# Patient Record
Sex: FEMALE | Race: WHITE | NOT HISPANIC OR LATINO | Employment: PART TIME | URBAN - METROPOLITAN AREA
[De-identification: names, ages, dates, MRNs, and addresses within clinical notes are randomized per-mention and may not be internally consistent; named-entity substitution may affect disease eponyms.]

---

## 2018-01-11 ENCOUNTER — OFFICE VISIT (OUTPATIENT)
Dept: URGENT CARE | Facility: URGENT CARE | Age: 28
End: 2018-01-11
Payer: COMMERCIAL

## 2018-01-11 VITALS
TEMPERATURE: 99.2 F | DIASTOLIC BLOOD PRESSURE: 73 MMHG | OXYGEN SATURATION: 99 % | SYSTOLIC BLOOD PRESSURE: 123 MMHG | WEIGHT: 138 LBS | HEART RATE: 80 BPM

## 2018-01-11 DIAGNOSIS — R68.84 JAW PAIN: Primary | ICD-10-CM

## 2018-01-11 LAB
DEPRECATED S PYO AG THROAT QL EIA: NORMAL
SPECIMEN SOURCE: NORMAL

## 2018-01-11 PROCEDURE — 87880 STREP A ASSAY W/OPTIC: CPT | Performed by: FAMILY MEDICINE

## 2018-01-11 PROCEDURE — 99213 OFFICE O/P EST LOW 20 MIN: CPT | Performed by: FAMILY MEDICINE

## 2018-01-11 PROCEDURE — 87081 CULTURE SCREEN ONLY: CPT | Performed by: FAMILY MEDICINE

## 2018-01-11 NOTE — LETTER
[facility]  [address] [facility] [address]        January 15, 2018    Meche Morrow  [address] [facility] [address]            Dear Meche,    The results of your recent tests were normal.  Below is a copy of the results.  It was a pleasure to see you at your last appointment.    If you have any questions or concerns, please call myself or my nurse at [phone].    Sincerely,    Altaf Bowles MD/isidro    Results for orders placed or performed in visit on 01/11/18   Strep, Rapid Screen   Result Value Ref Range    Specimen Description Throat     Rapid Strep A Screen       NEGATIVE: No Group A streptococcal antigen detected by immunoassay, await culture report.   Beta strep group A culture   Result Value Ref Range    Specimen Description Throat     Culture Micro No beta hemolytic Streptococcus Group A isolated

## 2018-01-11 NOTE — MR AVS SNAPSHOT
After Visit Summary   1/11/2018    Meche Morrow    MRN: [id number]           Patient Information     Date Of Birth          1990        Visit Information        Provider Department      1/11/2018 5:55 PM Altaf Bowles MD [facility]        Today's Diagnoses     Jaw pain    -  1      Care Instructions      Dental Treatment for Temporomandibular Disorders (TMD)  You have been diagnosed with temporomandibular disorder (TMD).This term describes a group of problems related to the temporomandibular joint (TMJ) and nearby muscles. The TMJ is located where the upper and lower jaws meet and is part of a structural system that includes the teeth. Because the joint and teeth work together, a problem with your teeth and bite can be linked to TMD. If you grind your teeth or if you have a bad bite, your dentist may be able to help. If your teeth need to be realigned to improve your bite, you may be referred to an orthodontist.  If you grind or clench your teeth    Bruxism (teeth grinding) or clenching strains the TMJ and related muscles. If you have these habits during the day, doing self-checks can help you stop. But it s hard to control these habits when you re asleep. That s when the use of a splint can often help:    How a splint works. A splint is an appliance that fits in the mouth. It may also be called an orthotic or . There are different kinds of splints for different kinds of needs. A splint can keep the upper and lower teeth apart. This helps protect tooth surfaces from grinding. A splint can also be made to reduce strain on the area.    Wearing and caring for your splint. To make a splint, your dentist or orthodontist may take impressions of your teeth. Then a splint will be made to fit your mouth. A splint:    May be worn during the day or only at night. Be sure to ask when and how often you should wear your splint.    Should be cleaned before you put it in and after  you take it out. Ask your dentist or orthodontist how to clean the splint.    Should be kept in a protective case, away from the reach of children and pets. This helps keep the splint from getting dirty or broken.  If your bite is incorrect  Malocclusion means the jaws or teeth don t fit together properly. This can result in pain and problems with jaw function. If your jaws or teeth are out of alignment, orthodontic treatment may help. If your bad bite is due to missing or damaged teeth, you may receive restorative treatment.    Restorative treatment. A bad bite can be caused by missing or damaged teeth. A dentist can restore teeth in many ways:    A crown is made of ceramic, metal, or porcelain and metal. It is cemented in place to repair a broken or damaged tooth.    A bridge is a false tooth fused between 2 crowns.    A dental implant is an artificial tooth root. It is attached to the jawbone as a base for an artificial tooth.    Orthodontic treatment. Sometimes the upper and lower jaws are out of alignment. Or teeth are out of line, turned, crowded, or spaced too far apart. Your orthodontist can align teeth with braces and other devices. This helps provide a more comfortable bite.  If surgery is needed  Surgery is rarely needed for TMD. However, if other treatments haven t worked, you may be referred to an oral and maxillofacial surgeon. Talk to your dentist about whether surgery might be right for you.   Date Last Reviewed: 7/13/2015 2000-2017 [employer]. [address] [place] [address], [place] [address]. All rights reserved. This information is not intended as a substitute for professional medical care. Always follow your healthcare professional's instructions.                Follow-ups after your visit        Who to contact     If you have questions or need follow up information about today's clinic visit or your schedule please contact [facility] directly at [phone].  Normal or  "non-critical lab and imaging results will be communicated to you by MyChart, letter or phone within 4 business days after the clinic has received the results. If you do not hear from us within 7 days, please contact the clinic through [employer] or phone. If you have a critical or abnormal lab result, we will notify you by phone as soon as possible.  Submit refill requests through [employer] or call your pharmacy and they will forward the refill request to us. Please allow 3 business days for your refill to be completed.          Additional Information About Your Visit        [facility] Information     [employer] lets you send messages to your doctor, view your test results, renew your prescriptions, schedule appointments and more. To sign up, go to [email or web address] . Click on \"Log in\" on the left side of the screen, which will take you to the Welcome page. Then click on \"Sign up Now\" on the right side of the page.     You will be asked to enter the access code listed below, as well as some personal information. Please follow the directions to create your username and password.     Your access code is: [email or web address]  Expires: 2018  6:56 PM     Your access code will  in 90 days. If you need help or a new code, please call your [facility] clinic or [phone].        Care EveryWhere ID     This is your Care EveryWhere ID. This could be used by other organizations to access your [facility] medical records  [id number]        Your Vitals Were     Pulse Temperature Pulse Oximetry             80 99.2  F (37.3  C) (Oral) 99%          Blood Pressure from Last 3 Encounters:   18 123/73   16 120/72   16 139/78    Weight from Last 3 Encounters:   18 138 lb (62.6 kg)   16 131 lb (59.4 kg)   16 127 lb 8 oz (57.8 kg)              We Performed the Following     Beta strep group A culture     Strep, Rapid Screen        Primary Care Provider Office Phone # Fax #    [facility] " [phone]       [address] [facility] [address]        Equal Access to Services     TADEO JO : Hadii aad ku hadlidiakahlil Baylee, wadrakeda noemichaim, mario albertota kaarturoda nacho, emilie cristobalin hayaan lisakelin diaz laShobhabritt antonio. [facility] [phone].    ATENCIÓN: Si habla español, tiene a parks disposición servicios gratuitos de asistencia lingüística. Llame al [phone].    We comply with applicable federal civil rights laws and [place] laws. We do not discriminate on the basis of race, color, national origin, age, disability, sex, sexual orientation, or gender identity.            Thank you!     Thank you for choosing [facility]  for your care. Our goal is always to provide you with excellent care. Hearing back from our patients is one way we can continue to improve our services. Please take a few minutes to complete the written survey that you may receive in the mail after your visit with us. Thank you!             Your Updated Medication List - Protect others around you: Learn how to safely use, store and throw away your medicines at www.disposemymeds.org.          This list is accurate as of: 1/11/18  6:56 PM.  Always use your most recent med list.                   Brand Name Dispense Instructions for use Diagnosis    drospirenone-ethinyl estradiol 3-0.02 MG per tablet    MAITE     Take 1 tablet by mouth daily        fluticasone 50 MCG/ACT spray    FLONASE    16 g    Spray 2 sprays into both nostrils daily    Sore throat

## 2018-01-12 LAB
BACTERIA SPEC CULT: NORMAL
SPECIMEN SOURCE: NORMAL

## 2018-01-12 NOTE — PROGRESS NOTES
SUBJECTIVE:                                                    Meche Morrow is a 27 year old female who presents to clinic today for the following health issues:      RESPIRATORY SYMPTOMS      Duration: X 3 days    Description  facial pain/pressure and jaw feels tight, teeth feel fuzzy - tonsil is swollen    Severity: mild    Accompanying signs and symptoms: None    History (predisposing factors):  none    Precipitating or alleviating factors: None    Therapies tried and outcome:  None    Today feeling better       Problem list and histories reviewed & adjusted, as indicated.  Additional history: as documented    There is no problem list on file for this patient.    No past surgical history on file.    Social History   Substance Use Topics     Smoking status: Never Smoker     Smokeless tobacco: Not on file     Alcohol use Not on file     No family history on file.      Current Outpatient Prescriptions   Medication Sig Dispense Refill     drospirenone-ethinyl estradiol (MAITE) 3-0.02 MG per tablet Take 1 tablet by mouth daily       fluticasone (FLONASE) 50 MCG/ACT nasal spray Spray 2 sprays into both nostrils daily (Patient not taking: Reported on 1/11/2018) 16 g 0     No Known Allergies  No lab results found.   BP Readings from Last 3 Encounters:   01/11/18 123/73   06/25/16 120/72   06/16/16 139/78    Wt Readings from Last 3 Encounters:   01/11/18 138 lb (62.6 kg)   06/25/16 131 lb (59.4 kg)   06/16/16 127 lb 8 oz (57.8 kg)                  Labs reviewed in EPIC          ROS:  Constitutional, HEENT, cardiovascular, pulmonary, gi and gu systems are negative, except as otherwise noted.      OBJECTIVE:   /73  Pulse 80  Temp 99.2  F (37.3  C) (Oral)  Wt 138 lb (62.6 kg)  SpO2 99%  There is no height or weight on file to calculate BMI.  GENERAL: healthy, alert and no distress  EYES: Eyes grossly normal to inspection, PERRL and conjunctivae and sclerae normal  HENT: normal cephalic/atraumatic, ear canals  and TM's normal, nose and mouth without ulcers or lesions, oropharynx clear, oral mucous membranes moist, sinuses: not tender and noTMJ click or tenderness noted, normal gum and dentition   RESP: lungs clear to auscultation - no rales, rhonchi or wheezes  ABDOMEN: soft, nontender, no hepatosplenomegaly, no masses and bowel sounds normal  MS: no gross musculoskeletal defects noted, no edema  NEURO: Normal strength and tone, mentation intact and speech normal  PSYCH: mentation appears normal, affect normal/bright      Diagnostic Test Results:  Results for orders placed or performed in visit on 01/11/18 (from the past 24 hour(s))   Strep, Rapid Screen   Result Value Ref Range    Specimen Description Throat     Rapid Strep A Screen       NEGATIVE: No Group A streptococcal antigen detected by immunoassay, await culture report.         ASSESSMENT/PLAN:         ICD-10-CM    1. Jaw pain R68.84 Strep, Rapid Screen     Beta strep group A culture       Discussed in detail differentials and further management. No obvious cause identified to to explain her symptoms. Suspect symptoms could be due to dental grinding. Suggested to follow up with her dentist. Dental hygiene discussed. Recommended well hydration and over-the-counter analgesia. Written instructions/information provided. Patient understood and in agreement with the above plan. All questions are answered.         Patient Instructions       Dental Treatment for Temporomandibular Disorders (TMD)  You have been diagnosed with temporomandibular disorder (TMD).This term describes a group of problems related to the temporomandibular joint (TMJ) and nearby muscles. The TMJ is located where the upper and lower jaws meet and is part of a structural system that includes the teeth. Because the joint and teeth work together, a problem with your teeth and bite can be linked to TMD. If you grind your teeth or if you have a bad bite, your dentist may be able to help. If your teeth  need to be realigned to improve your bite, you may be referred to an orthodontist.  If you grind or clench your teeth    Bruxism (teeth grinding) or clenching strains the TMJ and related muscles. If you have these habits during the day, doing self-checks can help you stop. But it s hard to control these habits when you re asleep. That s when the use of a splint can often help:    How a splint works. A splint is an appliance that fits in the mouth. It may also be called an orthotic or . There are different kinds of splints for different kinds of needs. A splint can keep the upper and lower teeth apart. This helps protect tooth surfaces from grinding. A splint can also be made to reduce strain on the area.    Wearing and caring for your splint. To make a splint, your dentist or orthodontist may take impressions of your teeth. Then a splint will be made to fit your mouth. A splint:    May be worn during the day or only at night. Be sure to ask when and how often you should wear your splint.    Should be cleaned before you put it in and after you take it out. Ask your dentist or orthodontist how to clean the splint.    Should be kept in a protective case, away from the reach of children and pets. This helps keep the splint from getting dirty or broken.  If your bite is incorrect  Malocclusion means the jaws or teeth don t fit together properly. This can result in pain and problems with jaw function. If your jaws or teeth are out of alignment, orthodontic treatment may help. If your bad bite is due to missing or damaged teeth, you may receive restorative treatment.    Restorative treatment. A bad bite can be caused by missing or damaged teeth. A dentist can restore teeth in many ways:    A crown is made of ceramic, metal, or porcelain and metal. It is cemented in place to repair a broken or damaged tooth.    A bridge is a false tooth fused between 2 crowns.    A dental implant is an artificial tooth root. It  is attached to the jawbone as a base for an artificial tooth.    Orthodontic treatment. Sometimes the upper and lower jaws are out of alignment. Or teeth are out of line, turned, crowded, or spaced too far apart. Your orthodontist can align teeth with braces and other devices. This helps provide a more comfortable bite.  If surgery is needed  Surgery is rarely needed for TMD. However, if other treatments haven t worked, you may be referred to an oral and maxillofacial surgeon. Talk to your dentist about whether surgery might be right for you.   Date Last Reviewed: 7/13/2015 2000-2017 [employer]. [address] [place] [address], [place] [address]. All rights reserved. This information is not intended as a substitute for professional medical care. Always follow your healthcare professional's instructions.            Altaf Bowles MD  [facility]

## 2018-01-12 NOTE — PATIENT INSTRUCTIONS
Dental Treatment for Temporomandibular Disorders (TMD)  You have been diagnosed with temporomandibular disorder (TMD).This term describes a group of problems related to the temporomandibular joint (TMJ) and nearby muscles. The TMJ is located where the upper and lower jaws meet and is part of a structural system that includes the teeth. Because the joint and teeth work together, a problem with your teeth and bite can be linked to TMD. If you grind your teeth or if you have a bad bite, your dentist may be able to help. If your teeth need to be realigned to improve your bite, you may be referred to an orthodontist.  If you grind or clench your teeth    Bruxism (teeth grinding) or clenching strains the TMJ and related muscles. If you have these habits during the day, doing self-checks can help you stop. But it s hard to control these habits when you re asleep. That s when the use of a splint can often help:    How a splint works. A splint is an appliance that fits in the mouth. It may also be called an orthotic or . There are different kinds of splints for different kinds of needs. A splint can keep the upper and lower teeth apart. This helps protect tooth surfaces from grinding. A splint can also be made to reduce strain on the area.    Wearing and caring for your splint. To make a splint, your dentist or orthodontist may take impressions of your teeth. Then a splint will be made to fit your mouth. A splint:    May be worn during the day or only at night. Be sure to ask when and how often you should wear your splint.    Should be cleaned before you put it in and after you take it out. Ask your dentist or orthodontist how to clean the splint.    Should be kept in a protective case, away from the reach of children and pets. This helps keep the splint from getting dirty or broken.  If your bite is incorrect  Malocclusion means the jaws or teeth don t fit together properly. This can result in pain and problems  with jaw function. If your jaws or teeth are out of alignment, orthodontic treatment may help. If your bad bite is due to missing or damaged teeth, you may receive restorative treatment.    Restorative treatment. A bad bite can be caused by missing or damaged teeth. A dentist can restore teeth in many ways:    A crown is made of ceramic, metal, or porcelain and metal. It is cemented in place to repair a broken or damaged tooth.    A bridge is a false tooth fused between 2 crowns.    A dental implant is an artificial tooth root. It is attached to the jawbone as a base for an artificial tooth.    Orthodontic treatment. Sometimes the upper and lower jaws are out of alignment. Or teeth are out of line, turned, crowded, or spaced too far apart. Your orthodontist can align teeth with braces and other devices. This helps provide a more comfortable bite.  If surgery is needed  Surgery is rarely needed for TMD. However, if other treatments haven t worked, you may be referred to an oral and maxillofacial surgeon. Talk to your dentist about whether surgery might be right for you.   Date Last Reviewed: 7/13/2015 2000-2017 [employer]. [address] [place] [address], [place] [address]. All rights reserved. This information is not intended as a substitute for professional medical care. Always follow your healthcare professional's instructions.

## 2021-12-16 ENCOUNTER — PRE VISIT (OUTPATIENT)
Dept: ONCOLOGY | Facility: CLINIC | Age: 31
End: 2021-12-16

## 2022-05-10 ENCOUNTER — PRENATAL OFFICE VISIT (OUTPATIENT)
Dept: OBGYN | Facility: CLINIC | Age: 32
End: 2022-05-10
Payer: COMMERCIAL

## 2022-05-10 VITALS — BODY MASS INDEX: 29.07 KG/M2 | WEIGHT: 154 LBS | HEIGHT: 61 IN

## 2022-05-10 DIAGNOSIS — Z34.01 ENCOUNTER FOR SUPERVISION OF NORMAL FIRST PREGNANCY IN FIRST TRIMESTER: Primary | ICD-10-CM

## 2022-05-10 DIAGNOSIS — Z11.1 SCREENING EXAMINATION FOR PULMONARY TUBERCULOSIS: ICD-10-CM

## 2022-05-10 DIAGNOSIS — Z23 NEED FOR TDAP VACCINATION: ICD-10-CM

## 2022-05-10 PROCEDURE — 99207 PR NO CHARGE NURSE ONLY: CPT

## 2022-05-10 RX ORDER — PRENATAL VIT/IRON FUM/FOLIC AC 27MG-0.8MG
1 TABLET ORAL DAILY
COMMUNITY

## 2022-05-10 RX ORDER — METRONIDAZOLE 500 MG/1
TABLET ORAL
COMMUNITY
Start: 2022-04-24 | End: 2022-06-03

## 2022-05-10 NOTE — Clinical Note
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 Pt has never been tested for TB, please advise if pt is needed testing prior to her appt on 6/3/2022. Pt denies any other concerns at this time. Thank you, NORA Piedra

## 2022-05-10 NOTE — PROGRESS NOTES
Telephone visit with patient for New Prenatal Intake and Education. This is patient's first pregnancy. Handouts reviewed and will be provided at next prenatal appointment. Scheduled for New Prenatal with Olivia AUGUSTE CNP on 6/3/2022.     Pt's  was treated for TB (unsure if it was active), pt has never been tested and wondering affects on baby if any.      Prenatal OB Questionnaire  Patient supplied answers from flow sheet for:  Prenatal OB Questionnaire.  Past Medical History  Have you ever recieved care for your mental health? : (!) Yes (was being seen in therapy)  Have you ever been in a major accident or suffered serious trauma?: No  Within the last year, has anyone hit, slapped, kicked or otherwise hurt you?: No  In the last year, has anyone forced you to have sex when you didn't want to?: No    Past Medical History 2   Have you ever received a blood transfusion?: No  Would you accept a blood transfusion if was medically recommended?: Yes  Does anyone in your home smoke?: No   Is your blood type Rh negative?: Unknown  Have you ever ?: No  Have you been hospitalized for a nonsurgical reason excluding normal delivery?: No  Have you ever had an abnormal pap smear?: No    Past Medical History (Continued)  Do you have a history of abnormalities of the uterus?: No  Did your mother take BHARTI or any other hormones when she was pregnant with you?: Unknown  Do you have any other problems we have not asked about which you feel may be important to this pregnancy?: (!) Yes (recently treated for BV-nidazole 500 MG)    PHQ-2 Score:     PHQ-2 ( 1999 [employer]) 5/10/2022   Q1: Little interest or pleasure in doing things 0   Q2: Feeling down, depressed or hopeless 0   PHQ-2 Score 0         Allergies as of 5/10/2022:    Allergies as of 05/10/2022     (No Known Allergies)       Current medications are:  Current Outpatient Medications   Medication Sig Dispense Refill     Prenatal Vit-Fe Fumarate-FA (PRENATAL  MULTIVITAMIN W/IRON) 27-0.8 MG tablet Take 1 tablet by mouth daily       drospirenone-ethinyl estradiol (MAITE) 3-0.02 MG per tablet Take 1 tablet by mouth daily (Patient not taking: Reported on 5/10/2022)       fluticasone (FLONASE) 50 MCG/ACT nasal spray Spray 2 sprays into both nostrils daily (Patient not taking: No sig reported) 16 g 0     metroNIDAZOLE (FLAGYL) 500 MG tablet Take 1 Tablet (500 mg) by mouth two times a day for 7 days. NO ALCOHOL WHILE TAKING (Patient not taking: Reported on 5/10/2022)           Early ultrasound screening tool:    Does patient have irregular periods?  No  Did patient use hormonal birth control in the three months prior to positive urine pregnancy test? No  Is the patient breastfeeding?  No  Is the patient 10 weeks or greater at time of education visit?  No    Dorothea Glez RN on 5/10/2022 at 8:37 AM

## 2022-05-15 ENCOUNTER — HEALTH MAINTENANCE LETTER (OUTPATIENT)
Age: 32
End: 2022-05-15

## 2022-05-16 NOTE — PROGRESS NOTES
"  Assessment & Plan     Back pain  Resolved, will await urine culture as noted few WBCs and RBCs in urine  - UA Macro with Reflex to Micro and Culture - lab collect; Future  - UA Macro with Reflex to Micro and Culture - lab collect  - Urine Microscopic Exam  - Urine Culture    Vaginal discharge  None today and not bothersome    Pregnant state, incidental  Follow-up with OB/gyn scheduled early next month               BMI:   Estimated body mass index is 28.91 kg/m  as calculated from the following:    Height as of this encounter: 1.549 m (5' 1\").    Weight as of this encounter: 69.4 kg (153 lb).   Weight management plan: Discussed healthy diet and exercise guidelines        No follow-ups on file.    Kady Rodney MD  [facility]   Meche is a 31 year old who presents for the following health issues     History of Present Illness     Asthma:  She presents for follow up of asthma.  She has no cough, no wheezing, and no shortness of breath. She is not using a relief medication. She typically misses taking her controller medication 7 time(s) per week.Patient is aware of the following triggers: none. The patient has not had a visit to the Emergency Room, Urgent Care or Hospital due to asthma since the last clinic visit.     She eats 2-3 servings of fruits and vegetables daily.She consumes 6 sweetened beverage(s) daily.She exercises with enough effort to increase her heart rate 9 or less minutes per day.  She exercises with enough effort to increase her heart rate 3 or less days per week.   She is taking medications regularly.     Vaginal odor and discharge, pain in back   Not urinating often, shooting pain in abdomen    8 weeks pregnant    Yesterday did not have urge to urinate. Bladder was pretty full and she was drinking a lot of water  Was getting pain in right back.  It felt sore.   Also noticed vaginal odor for one day  Had previously been treated with vaginitis and then had " intercourse with  and noticed smell return now  Not having any vaginal odor at this time  Recalls having a bladder infection x 1.     No pains now, no vaginal odor now          Review of Systems   Constitutional, HEENT, cardiovascular, pulmonary, gi and gu systems are negative, except as otherwise noted.      Objective    LMP 03/20/2022   There is no height or weight on file to calculate BMI.  Physical Exam   GENERAL: healthy, alert and no distress  RESP: lungs clear to auscultation - no rales, rhonchi or wheezes  CV: regular rate and rhythm, normal S1 S2, no S3 or S4, no murmur, click or rub, no peripheral edema and peripheral pulses strong    Results for orders placed or performed in visit on 05/17/22 (from the past 24 hour(s))   UA Macro with Reflex to Micro and Culture - lab collect    Specimen: Urine, Midstream   Result Value Ref Range    Color Urine Yellow Colorless, Straw, Light Yellow, Yellow    Appearance Urine Slightly Cloudy (A) Clear    Glucose Urine Negative Negative mg/dL    Bilirubin Urine Negative Negative    Ketones Urine 15  (A) Negative mg/dL    Specific Gravity Urine >=1.030 1.003 - 1.035    Blood Urine Negative Negative    pH Urine 6.0 5.0 - 7.0    Protein Albumin Urine Negative Negative mg/dL    Urobilinogen Urine 0.2 0.2, 1.0 E.U./dL    Nitrite Urine Negative Negative    Leukocyte Esterase Urine Moderate (A) Negative   Urine Microscopic Exam   Result Value Ref Range    Bacteria Urine Few (A) None Seen /HPF    RBC Urine 5-10 (A) 0-2 /HPF /HPF    WBC Urine 25-50 (A) 0-5 /HPF /HPF    Squamous Epithelials Urine Moderate (A) None Seen /LPF

## 2022-05-17 ENCOUNTER — OFFICE VISIT (OUTPATIENT)
Dept: FAMILY MEDICINE | Facility: CLINIC | Age: 32
End: 2022-05-17
Payer: COMMERCIAL

## 2022-05-17 VITALS
RESPIRATION RATE: 16 BRPM | WEIGHT: 153 LBS | TEMPERATURE: 99.2 F | BODY MASS INDEX: 28.89 KG/M2 | HEIGHT: 61 IN | DIASTOLIC BLOOD PRESSURE: 77 MMHG | OXYGEN SATURATION: 97 % | SYSTOLIC BLOOD PRESSURE: 124 MMHG | HEART RATE: 76 BPM

## 2022-05-17 DIAGNOSIS — R10.9 FLANK PAIN: ICD-10-CM

## 2022-05-17 DIAGNOSIS — N89.8 VAGINAL DISCHARGE: Primary | ICD-10-CM

## 2022-05-17 DIAGNOSIS — Z33.1 PREGNANT STATE, INCIDENTAL: ICD-10-CM

## 2022-05-17 LAB
ALBUMIN UR-MCNC: NEGATIVE MG/DL
APPEARANCE UR: ABNORMAL
BACTERIA #/AREA URNS HPF: ABNORMAL /HPF
BILIRUB UR QL STRIP: NEGATIVE
COLOR UR AUTO: YELLOW
GLUCOSE UR STRIP-MCNC: NEGATIVE MG/DL
HGB UR QL STRIP: NEGATIVE
KETONES UR STRIP-MCNC: 15 MG/DL
LEUKOCYTE ESTERASE UR QL STRIP: ABNORMAL
NITRATE UR QL: NEGATIVE
PH UR STRIP: 6 [PH] (ref 5–7)
RBC #/AREA URNS AUTO: ABNORMAL /HPF
SP GR UR STRIP: >=1.03 (ref 1–1.03)
SQUAMOUS #/AREA URNS AUTO: ABNORMAL /LPF
UROBILINOGEN UR STRIP-ACNC: 0.2 E.U./DL
WBC #/AREA URNS AUTO: ABNORMAL /HPF

## 2022-05-17 PROCEDURE — 99203 OFFICE O/P NEW LOW 30 MIN: CPT | Performed by: FAMILY MEDICINE

## 2022-05-17 PROCEDURE — 87086 URINE CULTURE/COLONY COUNT: CPT | Performed by: FAMILY MEDICINE

## 2022-05-17 PROCEDURE — 81001 URINALYSIS AUTO W/SCOPE: CPT | Performed by: FAMILY MEDICINE

## 2022-05-17 ASSESSMENT — PAIN SCALES - GENERAL: PAINLEVEL: NO PAIN (0)

## 2022-05-19 LAB — BACTERIA UR CULT: NO GROWTH

## 2022-06-03 ENCOUNTER — PRENATAL OFFICE VISIT (OUTPATIENT)
Dept: OBGYN | Facility: CLINIC | Age: 32
End: 2022-06-03
Payer: COMMERCIAL

## 2022-06-03 VITALS
DIASTOLIC BLOOD PRESSURE: 78 MMHG | WEIGHT: 157.8 LBS | OXYGEN SATURATION: 94 % | HEART RATE: 89 BPM | BODY MASS INDEX: 29.79 KG/M2 | SYSTOLIC BLOOD PRESSURE: 128 MMHG | HEIGHT: 61 IN

## 2022-06-03 DIAGNOSIS — Z34.00 SUPERVISION OF NORMAL FIRST PREGNANCY, ANTEPARTUM: Primary | ICD-10-CM

## 2022-06-03 DIAGNOSIS — Z11.1 SCREENING EXAMINATION FOR PULMONARY TUBERCULOSIS: ICD-10-CM

## 2022-06-03 DIAGNOSIS — Z34.01 ENCOUNTER FOR SUPERVISION OF NORMAL FIRST PREGNANCY IN FIRST TRIMESTER: ICD-10-CM

## 2022-06-03 LAB
ABO/RH(D): NORMAL
ALBUMIN UR-MCNC: NEGATIVE MG/DL
AMORPH CRY #/AREA URNS HPF: ABNORMAL /HPF
ANTIBODY SCREEN: NEGATIVE
APPEARANCE UR: ABNORMAL
BILIRUB UR QL STRIP: NEGATIVE
COLOR UR AUTO: YELLOW
ERYTHROCYTE [DISTWIDTH] IN BLOOD BY AUTOMATED COUNT: 12.1 % (ref 10–15)
GLUCOSE UR STRIP-MCNC: NEGATIVE MG/DL
HCT VFR BLD AUTO: 37.3 % (ref 35–47)
HGB BLD-MCNC: 13 G/DL (ref 11.7–15.7)
HGB UR QL STRIP: NEGATIVE
KETONES UR STRIP-MCNC: NEGATIVE MG/DL
LEUKOCYTE ESTERASE UR QL STRIP: ABNORMAL
MCH RBC QN AUTO: 29.6 PG (ref 26.5–33)
MCHC RBC AUTO-ENTMCNC: 34.9 G/DL (ref 31.5–36.5)
MCV RBC AUTO: 85 FL (ref 78–100)
NITRATE UR QL: NEGATIVE
PH UR STRIP: 7 [PH] (ref 5–7)
PLATELET # BLD AUTO: 300 10E3/UL (ref 150–450)
RBC # BLD AUTO: 4.39 10E6/UL (ref 3.8–5.2)
RBC #/AREA URNS AUTO: ABNORMAL /HPF
SP GR UR STRIP: 1.01 (ref 1–1.03)
SPECIMEN EXPIRATION DATE: NORMAL
SQUAMOUS #/AREA URNS AUTO: ABNORMAL /LPF
UROBILINOGEN UR STRIP-ACNC: 0.2 E.U./DL
WBC # BLD AUTO: 9.5 10E3/UL (ref 4–11)
WBC #/AREA URNS AUTO: ABNORMAL /HPF

## 2022-06-03 PROCEDURE — 86900 BLOOD TYPING SEROLOGIC ABO: CPT | Performed by: NURSE PRACTITIONER

## 2022-06-03 PROCEDURE — 99207 PR FIRST OB VISIT: CPT | Performed by: NURSE PRACTITIONER

## 2022-06-03 PROCEDURE — 87591 N.GONORRHOEAE DNA AMP PROB: CPT | Performed by: NURSE PRACTITIONER

## 2022-06-03 PROCEDURE — 86901 BLOOD TYPING SEROLOGIC RH(D): CPT | Performed by: NURSE PRACTITIONER

## 2022-06-03 PROCEDURE — G0145 SCR C/V CYTO,THINLAYER,RESCR: HCPCS | Performed by: NURSE PRACTITIONER

## 2022-06-03 PROCEDURE — 86762 RUBELLA ANTIBODY: CPT | Performed by: NURSE PRACTITIONER

## 2022-06-03 PROCEDURE — 87491 CHLMYD TRACH DNA AMP PROBE: CPT | Performed by: NURSE PRACTITIONER

## 2022-06-03 PROCEDURE — 85027 COMPLETE CBC AUTOMATED: CPT | Performed by: NURSE PRACTITIONER

## 2022-06-03 PROCEDURE — 86780 TREPONEMA PALLIDUM: CPT | Performed by: NURSE PRACTITIONER

## 2022-06-03 PROCEDURE — 86850 RBC ANTIBODY SCREEN: CPT | Performed by: NURSE PRACTITIONER

## 2022-06-03 PROCEDURE — 36415 COLL VENOUS BLD VENIPUNCTURE: CPT | Performed by: NURSE PRACTITIONER

## 2022-06-03 PROCEDURE — 87389 HIV-1 AG W/HIV-1&-2 AB AG IA: CPT | Performed by: NURSE PRACTITIONER

## 2022-06-03 PROCEDURE — 87086 URINE CULTURE/COLONY COUNT: CPT | Performed by: NURSE PRACTITIONER

## 2022-06-03 PROCEDURE — 81001 URINALYSIS AUTO W/SCOPE: CPT | Performed by: NURSE PRACTITIONER

## 2022-06-03 PROCEDURE — 87340 HEPATITIS B SURFACE AG IA: CPT | Performed by: NURSE PRACTITIONER

## 2022-06-03 PROCEDURE — 86803 HEPATITIS C AB TEST: CPT | Performed by: NURSE PRACTITIONER

## 2022-06-03 PROCEDURE — 86481 TB AG RESPONSE T-CELL SUSP: CPT | Performed by: NURSE PRACTITIONER

## 2022-06-03 ASSESSMENT — PAIN SCALES - GENERAL: PAINLEVEL: NO PAIN (0)

## 2022-06-03 NOTE — PATIENT INSTRUCTIONS
If you have any questions regarding your visit, Please contact your care team.     [facility]: [phone]  To Schedule an Appointment 24/7  Call: [phone] HOURS TELEPHONE NUMBER     Olivia Ogden- APRN CNP      Lorna Piedra-NORA Pearson-RN  Torri Burkett-Surgery Scheduler  Annabella-Surgery Scheduler         Monday 7:30 am-5:00 pm    Tuesday 8:00 am-4:00 pm    Wednesday 7:30 am-4:00 pm  [place]    Thursday 8:00 am-11:00 am    Friday 7:30 am-4:00 pm [address] [place] [address] [phone] ask for [facility]  [phone] Fax    Imaging Scheduling all locations  [phone]     [facility] Labor and Delivery  [address] [place] [address]   [place], [place] [id number] [phone]         Urgent Care locations:  [facility]   Monday-Friday  10 am - 8 pm  Saturday and Sunday   9 am - 5 pm     [phone]   If you need a medication refill, please contact your pharmacy. Please allow 3 business days for your refill to be completed.  As always, Thank you for trusting us with your healthcare needs!      see additional instructions from your care team below

## 2022-06-03 NOTE — PROGRESS NOTES
"Meche is a 31 year old  @ 10 weeks here for new OB visit.  Cycles regular.  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   with latent TB. Was treated in 2017, recent chest x-ray clear. Patient has never been screened, will check TB gold today.    See OB questionnaire for pertinent components of HPI.    OBhx: never pregnant  Gyne: Pap smears Normal  Past Medical History:   Diagnosis Date     HSV (herpes simplex virus) infection      Past Surgical History:   Procedure Laterality Date     NO HISTORY OF SURGERY       Patient Active Problem List    Diagnosis Date Noted     Need for Tdap vaccination 05/10/2022     Priority: Medium      No Known Allergies  Current Outpatient Medications   Medication Sig Dispense Refill     Prenatal Vit-Fe Fumarate-FA (PRENATAL MULTIVITAMIN W/IRON) 27-0.8 MG tablet Take 1 tablet by mouth daily         Review of Systems:     CONSTITUTIONAL:NEGATIVE for fever, chills, change in weight  INTEGUMENTARY/SKIN: NEGATIVE for worrisome rashes, moles or lesions  EYES: NEGATIVE for vision changes or irritation  ENT/MOUTH: NEGATIVE for ear, mouth and throat problems  RESP:NEGATIVE for significant cough or SOB  BREAST: NEGATIVE for masses, tenderness or discharge  CV: NEGATIVE for chest pain, palpitations or peripheral edema  GI: NEGATIVE for nausea, abdominal pain, heartburn, or change in bowel habits  :  Denies current vaginal bleeding, abnormal vaginal discharge  NEURO: NEGATIVE for weakness, dizziness or paresthesias  HEME/ALLERGY/IMMUNE: NEGATIVE for bleeding problems  PSYCHIATRIC: NEGATIVE for changes in mood or affect      Past Medical History of Father of Baby: Latent TB  History Since Last Menstrual Period: No Problems    Physical Exam: /78 (BP Location: Right arm, Patient Position: Sitting, Cuff Size: Adult Regular)   Pulse 89   Ht 1.549 m (5' 1\")   Wt 71.6 kg (157 lb 12.8 oz)   LMP 2022   SpO2 94%   BMI 29.82 " kg/m    General: Well developed, well nourished female  Skin: Normal  Abdomen: Benign and No masses, organomegaly    Extremities: Normal  Neurological: Normal   Perineum: Intact   Vulva: Normal  Vagina: Normal mucosa, no discharge  Cervix: Nulliparous, closed    A/P 31 year old  at 10 weeks  Discussed physician coverage, tertiary support, diet, exercise, weight gain, schedule of visits, routine and indicated ultrasounds, and childbirth education.  Prenatal labs: Prenatal Panel, GC, Chlamydia, Urine Culture, Pap smear.  Continue taking prenatal vitamins  Discussed maternal quad screening between 15-20 weeks and reviewed benefits and limitations.  Ultrasound to confirm dates.    Check TB gold today.   Valtrex at 35 weeks.    Discussed aspirin use in pregnancy.  Low-dose aspirin prophylaxis can be beneficial in women at high risk of developing preeclampsia.  I generally recommend we begin aspirin at about 12-13 weeks gestation and continue until at least 36 weeks.     Women with at least one of the following conditions are considered high risk for developing preeclampsia: Previous pregnancy with preeclampsia,  multifetal-gestation, chronic hypertension, diabetes mellitus, chronic kidney disease, autoimmune disease.     Women with more than 1 of the following conditions may also consider low-dose aspirin prophylaxis in pregnancy: Nulliparity, BMI greater than 30, family history of preeclampsia (mother or sister), AMA, socio-demographic characteristics, personal risk factors.       Patient does not meet the above criteria.       Olivia AUGUSTE CNP

## 2022-06-04 LAB
C TRACH DNA SPEC QL NAA+PROBE: NEGATIVE
HBV SURFACE AG SERPL QL IA: NONREACTIVE
HCV AB SERPL QL IA: NONREACTIVE
HIV 1+2 AB+HIV1 P24 AG SERPL QL IA: NONREACTIVE
N GONORRHOEA DNA SPEC QL NAA+PROBE: NEGATIVE
T PALLIDUM AB SER QL: NONREACTIVE

## 2022-06-05 LAB
BACTERIA UR CULT: NO GROWTH
RUBV IGG SERPL QL IA: 4.83 INDEX
RUBV IGG SERPL QL IA: POSITIVE

## 2022-06-06 LAB
GAMMA INTERFERON BACKGROUND BLD IA-ACNC: 0.04 IU/ML
M TB IFN-G BLD-IMP: NEGATIVE
M TB IFN-G CD4+ BCKGRND COR BLD-ACNC: 9.96 IU/ML
MITOGEN IGNF BCKGRD COR BLD-ACNC: 0 IU/ML
MITOGEN IGNF BCKGRD COR BLD-ACNC: 0 IU/ML
QUANTIFERON MITOGEN: 10 IU/ML
QUANTIFERON NIL TUBE: 0.04 IU/ML
QUANTIFERON TB1 TUBE: 0.04 IU/ML
QUANTIFERON TB2 TUBE: 0.04

## 2022-06-07 LAB
BKR LAB AP GYN ADEQUACY: NORMAL
BKR LAB AP GYN INTERPRETATION: NORMAL
BKR LAB AP HPV REFLEX: NORMAL
BKR LAB AP PREVIOUS ABNORMAL: NORMAL
PATH REPORT.COMMENTS IMP SPEC: NORMAL
PATH REPORT.COMMENTS IMP SPEC: NORMAL
PATH REPORT.RELEVANT HX SPEC: NORMAL

## 2022-06-09 ENCOUNTER — ANCILLARY PROCEDURE (OUTPATIENT)
Dept: ULTRASOUND IMAGING | Facility: CLINIC | Age: 32
End: 2022-06-09
Attending: NURSE PRACTITIONER
Payer: COMMERCIAL

## 2022-06-09 DIAGNOSIS — Z34.00 SUPERVISION OF NORMAL FIRST PREGNANCY, ANTEPARTUM: ICD-10-CM

## 2022-06-09 PROCEDURE — 76801 OB US < 14 WKS SINGLE FETUS: CPT | Mod: TC | Performed by: RADIOLOGY

## 2022-06-09 PROCEDURE — 76817 TRANSVAGINAL US OBSTETRIC: CPT | Mod: TC | Performed by: RADIOLOGY

## 2022-06-24 ENCOUNTER — MYC MEDICAL ADVICE (OUTPATIENT)
Dept: OBGYN | Facility: CLINIC | Age: 32
End: 2022-06-24

## 2022-06-28 ENCOUNTER — PRENATAL OFFICE VISIT (OUTPATIENT)
Dept: OBGYN | Facility: CLINIC | Age: 32
End: 2022-06-28
Payer: COMMERCIAL

## 2022-06-28 VITALS
OXYGEN SATURATION: 96 % | HEART RATE: 80 BPM | HEIGHT: 61 IN | WEIGHT: 160 LBS | BODY MASS INDEX: 30.21 KG/M2 | SYSTOLIC BLOOD PRESSURE: 134 MMHG | DIASTOLIC BLOOD PRESSURE: 75 MMHG

## 2022-06-28 DIAGNOSIS — Z34.00 SUPERVISION OF NORMAL FIRST PREGNANCY, ANTEPARTUM: Primary | ICD-10-CM

## 2022-06-28 PROCEDURE — 99207 PR PRENATAL VISIT: CPT | Performed by: NURSE PRACTITIONER

## 2022-06-28 ASSESSMENT — PAIN SCALES - GENERAL: PAINLEVEL: NO PAIN (0)

## 2022-06-28 NOTE — PROGRESS NOTES
Patient presents for routine prenatal visit. Prenatal flowsheet reviewed and updated as needed.  Denies vaginal bleeding, loss of fluid, contractions or cramping. Denies headache, upper abdominal pain, vision changes, lower extremity swelling, chest pain or shortness of breath. Having intermittent nausea/dry heaving.     Anticipatory guidance appropriate for gestational age reviewed.  PE: See OB vitals    Routine prenatal care:  Screening ultrasound ordered.  Discussed MQS on return to clinic.    Questions asked and answered. Next OB visit in 4 week(s) with OB Physician.    Olivia AUGUSTE CNP

## 2022-06-28 NOTE — PATIENT INSTRUCTIONS
If you have any questions regarding your visit, Please contact your care team.     [facility] Services: [phone]  To Schedule an Appointment 24/7  Call: [phone] HOURS TELEPHONE NUMBER     Olivia Ogden- APRN CNP      Lorna Piedra-NORA Pearson-RN  Torri Burkett-Surgery Scheduler  Annabella-Surgery Scheduler         Monday 7:30 am-5:00 pm    Tuesday 8:00 am-4:00 pm    Wednesday 7:30 am-4:00 pm  [place]    Thursday 8:00 am-11:00 am    Friday 7:30 am-4:00 pm [address] [place] [address] [phone] ask for [facility]  [phone] Fax    Imaging Scheduling all locations  [phone]     [facility] Labor and Delivery  [address] [place] [address]   [place], [place] [id number] [phone]         Urgent Care locations:  [facility]   Monday-Friday  10 am - 8 pm  Saturday and Sunday   9 am - 5 pm     [phone]   If you need a medication refill, please contact your pharmacy. Please allow 3 business days for your refill to be completed.  As always, Thank you for trusting us with your healthcare needs!      see additional instructions from your care team below

## 2022-06-30 ENCOUNTER — TELEPHONE (OUTPATIENT)
Dept: OBGYN | Facility: CLINIC | Age: 32
End: 2022-06-30

## 2022-06-30 NOTE — TELEPHONE ENCOUNTER
Unable to reach patient via phone. Left message to call back at [phone]    Pt just need to be schedule with MD or DO in 4 weeks for est prenatal per Ruchi note.    Jennifer Dc, FANNY 6/30/2022 2:17 PM

## 2022-06-30 NOTE — TELEPHONE ENCOUNTER
"[facility] Call Center    Phone Message    May a detailed message be left on voicemail: yes     Reason for Call: Pt said that she is very forgetful.  She said that Olivia Ogden told her to schedule a \"very specific\" appointment but Pt doesn't know what kind of appointment it is.  She was told by nurses to schedule an established prenatal visit but Pt thinks that's incorrect.  She was also told that this \"very specific\" appointment needs to be with a \"special\" provider but she doesn't remember the name of the provider she is supposed to schedule with.  She thinks it might be a Dr. Allen.  Please call Pt to clarify.  Thanks.    Action Taken: Message routed to:  [phone]    Travel Screening: Not Applicable                                                                      "

## 2022-07-01 NOTE — TELEPHONE ENCOUNTER
Patient scheduled for next prenatal visit with Dr. Salas.  Jennifer Dc, [facility] 7/1/2022 8:56 AM

## 2022-07-26 ENCOUNTER — PRENATAL OFFICE VISIT (OUTPATIENT)
Dept: OBGYN | Facility: CLINIC | Age: 32
End: 2022-07-26
Payer: COMMERCIAL

## 2022-07-26 VITALS
DIASTOLIC BLOOD PRESSURE: 81 MMHG | SYSTOLIC BLOOD PRESSURE: 126 MMHG | HEART RATE: 96 BPM | WEIGHT: 168.6 LBS | BODY MASS INDEX: 31.86 KG/M2

## 2022-07-26 DIAGNOSIS — R00.0 TACHYCARDIA: ICD-10-CM

## 2022-07-26 DIAGNOSIS — Z34.00 SUPERVISION OF NORMAL FIRST PREGNANCY, ANTEPARTUM: Primary | ICD-10-CM

## 2022-07-26 PROCEDURE — 99207 PR PRENATAL VISIT: CPT | Performed by: OBSTETRICS & GYNECOLOGY

## 2022-07-26 PROCEDURE — 36415 COLL VENOUS BLD VENIPUNCTURE: CPT | Performed by: OBSTETRICS & GYNECOLOGY

## 2022-07-26 PROCEDURE — 81511 FTL CGEN ABNOR FOUR ANAL: CPT | Mod: 90 | Performed by: OBSTETRICS & GYNECOLOGY

## 2022-07-26 PROCEDURE — 99000 SPECIMEN HANDLING OFFICE-LAB: CPT | Performed by: OBSTETRICS & GYNECOLOGY

## 2022-07-26 RX ORDER — ALBUTEROL SULFATE 90 UG/1
1-2 AEROSOL, METERED RESPIRATORY (INHALATION)
COMMUNITY
Start: 2021-02-12 | End: 2022-08-23

## 2022-07-26 NOTE — PROGRESS NOTES
Presents for routine  appointment.    Concerns:  Can feel rapid heart rate randomly at work or while laying down. No cp, sob. Can last for hours, nothing really helps.  Adequate water hydration.    No LOF/VB/Ctxs.  +Fetal Movement  ROS:   and GI  negative.     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 However, recommend EKG with FP. Patient aware and will schedule.    Routine Prenatal Care:  -Genetic screening - Quad screen ordered  -20 week ultrasound scheduled       Follow up in 4 week(s).    Raine Salas DO

## 2022-07-28 LAB
# FETUSES US: NORMAL
AFP MOM SERPL: 0.93
AFP SERPL-MCNC: 40 NG/ML
AGE - REPORTED: 30.5 YR
CURRENT SMOKER: NO
FAMILY MEMBER DISEASES HX: NO
GA METHOD: NORMAL
GA: NORMAL WK
HCG MOM SERPL: 1.4
HCG SERPL-ACNC: NORMAL IU/L
HX OF HEREDITARY DISORDERS: NO
IDDM PATIENT QL: NO
INHIBIN A MOM SERPL: 1.37
INHIBIN A SERPL-MCNC: 206 PG/ML
INTEGRATED SCN PATIENT-IMP: NORMAL
PATHOLOGY STUDY: NORMAL
SPECIMEN DRAWN SERPL: NORMAL
U ESTRIOL MOM SERPL: 1.16
U ESTRIOL SERPL-MCNC: 1.89 NG/ML

## 2022-08-01 ENCOUNTER — ANCILLARY PROCEDURE (OUTPATIENT)
Dept: ULTRASOUND IMAGING | Facility: CLINIC | Age: 32
End: 2022-08-01
Attending: NURSE PRACTITIONER
Payer: COMMERCIAL

## 2022-08-01 DIAGNOSIS — Z34.00 SUPERVISION OF NORMAL FIRST PREGNANCY, ANTEPARTUM: ICD-10-CM

## 2022-08-01 PROCEDURE — 76805 OB US >/= 14 WKS SNGL FETUS: CPT | Mod: TC | Performed by: RADIOLOGY

## 2022-08-23 ENCOUNTER — PRENATAL OFFICE VISIT (OUTPATIENT)
Dept: OBGYN | Facility: CLINIC | Age: 32
End: 2022-08-23
Payer: COMMERCIAL

## 2022-08-23 VITALS
SYSTOLIC BLOOD PRESSURE: 130 MMHG | HEIGHT: 61 IN | DIASTOLIC BLOOD PRESSURE: 81 MMHG | BODY MASS INDEX: 33.61 KG/M2 | OXYGEN SATURATION: 96 % | WEIGHT: 178 LBS | HEART RATE: 90 BPM

## 2022-08-23 DIAGNOSIS — Z34.00 SUPERVISION OF NORMAL FIRST PREGNANCY, ANTEPARTUM: Primary | ICD-10-CM

## 2022-08-23 PROCEDURE — 99207 PR PRENATAL VISIT: CPT | Performed by: NURSE PRACTITIONER

## 2022-08-23 ASSESSMENT — PAIN SCALES - GENERAL: PAINLEVEL: NO PAIN (0)

## 2022-08-23 NOTE — PROGRESS NOTES
Patient presents for routine prenatal visit. Prenatal flowsheet reviewed and updated as needed.  Denies vaginal bleeding, loss of fluid, contractions or cramping. Denies headache, nausea/vomiting, upper abdominal pain, vision changes, lower extremity swelling, chest pain or shortness of breath.     Anticipatory guidance appropriate for gestational age reviewed.  PE: See OB vitals    Pregnancy complicated by:  Last visit had discussed tachycardia-was to get EKG to evaluate and this has not been done. Patient states she feels this is anxiety as it manifests in specific situations-like work related. No other concerning symptoms. Would still recommend she complete this to rule out cardiac concerns. To scheduled with FP.    Routine prenatal care:  Labs on return to clinic.    Questions asked and answered. Next OB visit in 4 week(s) with OB Physician.    Olivia AUGUSTE CNP

## 2022-08-23 NOTE — PATIENT INSTRUCTIONS
If you have any questions regarding your visit, Please contact your care team.     [facility] Services: [phone]  To Schedule an Appointment 24/7  Call: [phone] Medical Center HOURS TELEPHONE NUMBER     Olivai Ogden- APRN CNP      Lorna Piedra-NORA Pearson-NORA Burkett-Surgery Scheduler  Annabella-Surgery Scheduler         Monday 7:30 am-5:00 pm    Tuesday 8:00 am-4:00 pm    Wednesday 7:30 am-4:00 pm  [place]    Thursday 8:00 am-11:00 am    Friday 7:30 am-4:00 pm [place] [address] [place] [address] [phone] ask for [facility]  [phone] Fax    Imaging Scheduling all locations  [phone]     [facility] Labor and Delivery  [address] [place] [address] Dr.  [place], [place] [id number] [phone]         Urgent Care locations:  [facility]   Monday-Friday  10 am - 8 pm  Saturday and Sunday   9 am - 5 pm     [phone]   If you need a medication refill, please contact your pharmacy. Please allow 3 business days for your refill to be completed.  As always, Thank you for trusting us with your healthcare needs!      see additional instructions from your care team below

## 2022-09-11 ENCOUNTER — HEALTH MAINTENANCE LETTER (OUTPATIENT)
Age: 32
End: 2022-09-11

## 2022-09-20 ENCOUNTER — NURSE TRIAGE (OUTPATIENT)
Dept: NURSING | Facility: CLINIC | Age: 32
End: 2022-09-20

## 2022-09-20 ENCOUNTER — PRENATAL OFFICE VISIT (OUTPATIENT)
Dept: OBGYN | Facility: CLINIC | Age: 32
End: 2022-09-20
Payer: COMMERCIAL

## 2022-09-20 VITALS
SYSTOLIC BLOOD PRESSURE: 130 MMHG | BODY MASS INDEX: 34.31 KG/M2 | HEART RATE: 87 BPM | WEIGHT: 181.6 LBS | OXYGEN SATURATION: 97 % | DIASTOLIC BLOOD PRESSURE: 75 MMHG

## 2022-09-20 DIAGNOSIS — R73.09 ABNORMAL GLUCOSE TOLERANCE TEST: ICD-10-CM

## 2022-09-20 DIAGNOSIS — Z34.00 SUPERVISION OF NORMAL FIRST PREGNANCY, ANTEPARTUM: Primary | ICD-10-CM

## 2022-09-20 LAB
GLUCOSE 1H P 50 G GLC PO SERPL-MCNC: 131 MG/DL (ref 70–129)
HGB BLD-MCNC: 12.7 G/DL (ref 11.7–15.7)

## 2022-09-20 PROCEDURE — 99207 PR PRENATAL VISIT: CPT | Performed by: NURSE PRACTITIONER

## 2022-09-20 PROCEDURE — 85018 HEMOGLOBIN: CPT | Performed by: NURSE PRACTITIONER

## 2022-09-20 PROCEDURE — 36415 COLL VENOUS BLD VENIPUNCTURE: CPT | Performed by: NURSE PRACTITIONER

## 2022-09-20 PROCEDURE — 82950 GLUCOSE TEST: CPT | Performed by: NURSE PRACTITIONER

## 2022-09-20 NOTE — PROGRESS NOTES
Patient presents for routine prenatal visit. Prenatal flowsheet reviewed and updated as needed.  Denies vaginal bleeding, loss of fluid, contractions or cramping. Denies headache, nausea/vomiting, upper abdominal pain, vision changes, lower extremity swelling, chest pain or shortness of breath.     Anticipatory guidance appropriate for gestational age reviewed.  PE: See OB vitals    Routine prenatal care:  Hx: HSV-plan Valtrex 3rd trimester  Has declined completing previously recommended EKG  Discussed Tdap on return to clinic.    Questions asked and answered. Next OB visit in 3-4 week(s) with OB Physician.    Olivia AUGUSTE CNP

## 2022-09-20 NOTE — TELEPHONE ENCOUNTER
Patient calling stating she has gestational glucose test today and accidentally ate.    Patient is requesting to know if she needs to reschedule?    Placed call to [facility] lab and was advised patient did not need to fast for 1 hour glucose testing.    Caller verbalized understanding. Denies further questions.      Meche Nina RN  [facility]        Reason for Disposition    Information only question and nurse able to answer    Additional Information    Negative: Nursing judgment    Negative: Nursing judgment    Negative: Nursing judgment    Negative: Nursing judgment    Protocols used: INFORMATION ONLY CALL - NO TRIAGE-A-OH

## 2022-09-20 NOTE — PATIENT INSTRUCTIONS
If you have any questions regarding your visit, Please contact your care team.     [facility] Services: [phone]  To Schedule an Appointment 24/7  Call: [phone] Memorial Hospital HOURS TELEPHONE NUMBER     Olivia Ogden- APRN CNP      Lorna Palma-Surgery Scheduler  Annabella-Surgery Scheduler         Monday 7:30 am-5:00 pm    Tuesday 8:00 am-4:00 pm    Wednesday 7:30 am-4:00 pm  [place]    Thursday 8:00 am-11:00 am    Friday 7:30 am-4:00 pm [address] [place] [address] [phone] ask for [facility]  [phone] Fax    Imaging Scheduling all locations  [phone]     [facility] Labor and Delivery  [address] [place] [address]   [place], [place] [id number] [phone]         Urgent Care locations:  [facility]   Monday-Friday  10 am - 8 pm  Saturday and Sunday   9 am - 5 pm     [phone]   If you need a medication refill, please contact your pharmacy. Please allow 3 business days for your refill to be completed.  As always, Thank you for trusting us with your healthcare needs!      see additional instructions from your care team below

## 2022-09-21 PROBLEM — R73.09 ABNORMAL GLUCOSE TOLERANCE TEST: Status: ACTIVE | Noted: 2022-09-21

## 2022-09-22 ENCOUNTER — TELEPHONE (OUTPATIENT)
Dept: OBGYN | Facility: CLINIC | Age: 32
End: 2022-09-22

## 2022-09-22 ENCOUNTER — LAB (OUTPATIENT)
Dept: LAB | Facility: CLINIC | Age: 32
End: 2022-09-22
Payer: COMMERCIAL

## 2022-09-22 DIAGNOSIS — R73.09 ABNORMAL GLUCOSE TOLERANCE TEST: ICD-10-CM

## 2022-09-22 LAB — GESTATIONAL GTT 1 HR POST DOSE: 131 MG/DL (ref 60–179)

## 2022-09-22 PROCEDURE — 82951 GLUCOSE TOLERANCE TEST (GTT): CPT

## 2022-09-22 PROCEDURE — 82952 GTT-ADDED SAMPLES: CPT

## 2022-09-22 PROCEDURE — 36415 COLL VENOUS BLD VENIPUNCTURE: CPT

## 2022-09-22 NOTE — TELEPHONE ENCOUNTER
patient wants to get a note for her  to get off work when the baby comes. Pt says baby is due Berta day. Jesus ?'s. Call Meche.

## 2022-09-23 PROBLEM — R73.09 ABNORMAL GLUCOSE TOLERANCE TEST: Status: RESOLVED | Noted: 2022-09-21 | Resolved: 2022-09-23

## 2022-09-23 LAB
GESTATIONAL GTT 2 HR POST DOSE: 127 MG/DL (ref 60–154)
GESTATIONAL GTT 3 HR POST DOSE: 120 MG/DL (ref 60–139)
GLUCOSE P FAST SERPL-MCNC: 69 MG/DL (ref 60–94)

## 2022-09-28 ENCOUNTER — MYC MEDICAL ADVICE (OUTPATIENT)
Dept: OBGYN | Facility: CLINIC | Age: 32
End: 2022-09-28

## 2022-09-28 NOTE — TELEPHONE ENCOUNTER
", 27w3d.    Pt is wondering if it is okay for her to use her albuterol inhaler once in awhile for her asthma.    Per uptodate on albuterol inhaler during pregnancy:  \"Albuterol is the preferred short-acting beta-2 agonist (ESPERANZA) for the management of asthma during pregnancy (ERS/TSANZ [[employer] 2020])\"      "

## 2022-09-28 NOTE — LETTER
[facility]  [address] [id number] [phone]          September 28, 2022    Meche Morrow                                                                                                                     [address] [facility] [address]            To whom it may concern,    The above patient is under my professional medical care for pregnancy.  Her estimated due date is 12/25/2022.        Sincerely,         Olivia AUGUSTE CNP

## 2022-10-07 ENCOUNTER — LAB (OUTPATIENT)
Dept: LAB | Facility: CLINIC | Age: 32
End: 2022-10-07

## 2022-10-07 ENCOUNTER — LAB (OUTPATIENT)
Dept: LAB | Facility: CLINIC | Age: 32
End: 2022-10-07
Payer: COMMERCIAL

## 2022-10-07 DIAGNOSIS — N89.8 VAGINAL DISCHARGE: ICD-10-CM

## 2022-10-07 LAB
CLUE CELLS: ABNORMAL
TRICHOMONAS, WET PREP: ABNORMAL
WBC'S/HIGH POWER FIELD, WET PREP: ABNORMAL
YEAST, WET PREP: ABNORMAL

## 2022-10-07 PROCEDURE — 87210 SMEAR WET MOUNT SALINE/INK: CPT

## 2022-10-24 ENCOUNTER — PRENATAL OFFICE VISIT (OUTPATIENT)
Dept: OBGYN | Facility: CLINIC | Age: 32
End: 2022-10-24
Payer: COMMERCIAL

## 2022-10-24 VITALS
WEIGHT: 190.8 LBS | SYSTOLIC BLOOD PRESSURE: 125 MMHG | BODY MASS INDEX: 36.05 KG/M2 | DIASTOLIC BLOOD PRESSURE: 73 MMHG | HEART RATE: 87 BPM

## 2022-10-24 DIAGNOSIS — Z34.00 SUPERVISION OF NORMAL FIRST PREGNANCY, ANTEPARTUM: Primary | ICD-10-CM

## 2022-10-24 PROCEDURE — 99207 PR PRENATAL VISIT: CPT | Performed by: NURSE PRACTITIONER

## 2022-10-24 NOTE — PROGRESS NOTES
Patient presents for routine prenatal visit. Prenatal flowsheet reviewed and updated as needed.  Denies vaginal bleeding, loss of fluid, contractions or cramping. Denies headache, nausea/vomiting, upper abdominal pain, vision changes, lower extremity swelling, chest pain or shortness of breath.     Anticipatory guidance appropriate for gestational age reviewed.  PE: See OB vitals    Pregnancy complicated by:  Hx: HSV-start Valtrex around 35 weeks.    Routine prenatal care:  Would like to do Tdap on RTC    Questions asked and answered. Next OB visit in 2 week(s) with OB Physician.    Olivia AUGUSTE CNP

## 2022-10-24 NOTE — PATIENT INSTRUCTIONS
If you have any questions regarding your visit, Please contact your care team.     [facility] Services: [phone]  To Schedule an Appointment 24/7  Call: [phone] Lake [facility] HOURS TELEPHONE NUMBER     Olivia Ogden- APRN CNP      Lorna Palma-Surgery Scheduler  Annabella-Surgery Scheduler         Monday 7:30 am-5:00 pm    Tuesday 8:00 am-4:00 pm    Wednesday 7:30 am-4:00 pm  [place]    Thursday 8:00 am-11:00 am    Friday 7:30 am-4:00 pm [address] [place] [address] [phone] ask for Women[facility]  [phone] Fax    Imaging Scheduling all locations  [phone]     [facility] Labor and Delivery  [address] [place] [address]   [place], [place] [id number] [phone]         Urgent Care locations:  [facility]   Monday-Friday  10 am - 8 pm  Saturday and Sunday   9 am - 5 pm     [phone]   If you need a medication refill, please contact your pharmacy. Please allow 3 business days for your refill to be completed.  As always, Thank you for trusting us with your healthcare needs!      see additional instructions from your care team below

## 2022-11-10 ENCOUNTER — PRENATAL OFFICE VISIT (OUTPATIENT)
Dept: OBGYN | Facility: CLINIC | Age: 32
End: 2022-11-10
Payer: COMMERCIAL

## 2022-11-10 VITALS
BODY MASS INDEX: 36.73 KG/M2 | OXYGEN SATURATION: 96 % | HEART RATE: 109 BPM | DIASTOLIC BLOOD PRESSURE: 84 MMHG | WEIGHT: 194.4 LBS | SYSTOLIC BLOOD PRESSURE: 138 MMHG

## 2022-11-10 DIAGNOSIS — B00.9 HERPES SIMPLEX TYPE 2 (HSV-2) INFECTION AFFECTING PREGNANCY, ANTEPARTUM: Primary | ICD-10-CM

## 2022-11-10 DIAGNOSIS — Z34.00 SUPERVISION OF NORMAL FIRST PREGNANCY, ANTEPARTUM: ICD-10-CM

## 2022-11-10 DIAGNOSIS — O98.519 HERPES SIMPLEX TYPE 2 (HSV-2) INFECTION AFFECTING PREGNANCY, ANTEPARTUM: Primary | ICD-10-CM

## 2022-11-10 PROCEDURE — 90471 IMMUNIZATION ADMIN: CPT | Performed by: OBSTETRICS & GYNECOLOGY

## 2022-11-10 PROCEDURE — 90715 TDAP VACCINE 7 YRS/> IM: CPT | Performed by: OBSTETRICS & GYNECOLOGY

## 2022-11-10 PROCEDURE — 99207 PR PRENATAL VISIT: CPT | Performed by: OBSTETRICS & GYNECOLOGY

## 2022-11-10 RX ORDER — VALACYCLOVIR HYDROCHLORIDE 500 MG/1
500 TABLET, FILM COATED ORAL DAILY
Qty: 45 TABLET | Refills: 0 | Status: SHIPPED | OUTPATIENT
Start: 2022-11-10 | End: 2024-03-14

## 2022-11-10 NOTE — PROGRESS NOTES
Patient presents for routine prenatal visit at 33w4d.  Patient without complaint.   PE: See OB vitals  Body mass index is 36.73 kg/m .    Doing well.  No concerns today.  No vaginal bleeding, loss of fluid, or contractions  Tdap given today  Questions asked and answered.      Emiliano Clark MD FACOG

## 2022-11-10 NOTE — PATIENT INSTRUCTIONS
If you have any questions regarding your visit, Please contact your care team.     [employer] Services: [phone]    To Schedule an Appointment 24/7  Call: [phone] Memorial Hospital HOURS TELEPHONE NUMBER     Emiliano Clark MD  Medical Director    Gene Pearson-NORA Palma-Surgery Scheduler  Annabella-Surgery Scheduler               Tuesday-[place]  7:30 a.m-4:30 p.m    Thursday-[place]  7:30 a.m-4:30 p.m    Typical Surgery Days: Tuesday or Friday [facility] [place]  [address] [place] [address]  PH: [phone]     Imaging Scheduling all locations  PH: [phone]     [facility] Labor and Delivery  [address] [place] [address] [place] [address]  PH: [phone]    [facility]  [address] [place] [address]  PH: [phone] Fax      **Surgeries** Our Surgery Schedulers will contact you to schedule. If you do not receive a call within 3 business days, please call [phone].    Urgent Care locations:  [facility] Monday-Friday  10 am - 8 pm  Saturday and Sunday   9 am - 5 pm  Monday-Friday   10 am - 8 pm  Saturday and Sunday   9 am - 5 pm   [phone]   If you need a medication refill, please contact your pharmacy. Please allow 3 business days for your refill to be completed.  As always, Thank you for trusting us with your healthcare needs!    see additional instructions from your care team below

## 2022-11-10 NOTE — NURSING NOTE
Prior to immunization administration, verified patients identity using patient s name and date of birth. Please see Immunization Activity for additional information.     Screening Questionnaire for Adult Immunization    Are you sick today?   No   Do you have allergies to medications, food, a vaccine component or latex?   No   Have you ever had a serious reaction after receiving a vaccination?   No   Do you have a long-term health problem with heart, lung, kidney, or metabolic disease (e.g., diabetes), asthma, a blood disorder, no spleen, complement component deficiency, a cochlear implant, or a spinal fluid leak?  Are you on long-term aspirin therapy?   No   Do you have cancer, leukemia, HIV/AIDS, or any other immune system problem?   No   Do you have a parent, brother, or sister with an immune system problem?   No   In the past 3 months, have you taken medications that affect  your immune system, such as prednisone, other steroids, or anticancer drugs; drugs for the treatment of rheumatoid arthritis, Crohn s disease, or psoriasis; or have you had radiation treatments?   No   Have you had a seizure, or a brain or other nervous system problem?   No   During the past year, have you received a transfusion of blood or blood    products, or been given immune (gamma) globulin or antiviral drug?   No   For women: Are you pregnant or is there a chance you could become       pregnant during the next month?   Yes   Have you received any vaccinations in the past 4 weeks?   No     Immunization questionnaire was positive for at least one answer.  Notified Dr. Clark.        Per orders of Dr. Clark, injection of Tdap given by Guillermina Worley MA. Patient instructed to remain in clinic for 15 minutes afterwards, and to report any adverse reaction to me immediately.       Screening performed by Guillermina Worley MA on 11/10/2022 at 9:21 AM.

## 2022-11-17 ENCOUNTER — OFFICE VISIT (OUTPATIENT)
Dept: OBGYN | Facility: CLINIC | Age: 32
End: 2022-11-17
Payer: COMMERCIAL

## 2022-11-17 VITALS
HEART RATE: 91 BPM | DIASTOLIC BLOOD PRESSURE: 85 MMHG | BODY MASS INDEX: 37.71 KG/M2 | WEIGHT: 199.6 LBS | OXYGEN SATURATION: 95 % | SYSTOLIC BLOOD PRESSURE: 151 MMHG

## 2022-11-17 DIAGNOSIS — N64.59 BLEEDING FROM BREAST: Primary | ICD-10-CM

## 2022-11-17 PROCEDURE — 99213 OFFICE O/P EST LOW 20 MIN: CPT | Performed by: OBSTETRICS & GYNECOLOGY

## 2022-11-17 NOTE — PROGRESS NOTES
SUBJECTIVE:  Meche Morrow is a 32 year old female who presents with complaint of right breast mass noticed a few days ago and bloody nipple discharge          Patient is 34 weeks pregnant  Review of patient's family history indicates:   Family History   Problem Relation Age of Onset     No Known Problems Mother      Asthma Father      Asthma Sister      No Known Problems Brother             OBJECTIVE:  Lungs: clear to auscultation  Heart:  regular rate and rythm without murmur  Breast: Right sided palpable mass:  2-3 cm, smooth and rubbery consistent with a galactocele  nipple discharge:  clear    No signs of bleeding noted today                ASSESSMENT:  Breast mass the mass seems most consistent with a plugged duct.           PLAN:  Breast ultrasound  Surgical consult

## 2022-11-28 ENCOUNTER — PRENATAL OFFICE VISIT (OUTPATIENT)
Dept: OBGYN | Facility: CLINIC | Age: 32
End: 2022-11-28
Payer: COMMERCIAL

## 2022-11-28 VITALS
HEART RATE: 92 BPM | SYSTOLIC BLOOD PRESSURE: 144 MMHG | DIASTOLIC BLOOD PRESSURE: 84 MMHG | OXYGEN SATURATION: 94 % | WEIGHT: 200 LBS | BODY MASS INDEX: 37.79 KG/M2

## 2022-11-28 DIAGNOSIS — Z34.00 SUPERVISION OF NORMAL FIRST PREGNANCY, ANTEPARTUM: Primary | ICD-10-CM

## 2022-11-28 PROCEDURE — 99207 PR PRENATAL VISIT: CPT | Performed by: OBSTETRICS & GYNECOLOGY

## 2022-11-28 PROCEDURE — 87653 STREP B DNA AMP PROBE: CPT | Performed by: OBSTETRICS & GYNECOLOGY

## 2022-11-28 NOTE — PROGRESS NOTES
Patient presents for routine prenatal visit at 36w1d.  Patient without complaint.   PE: See OB vitals  There is no height or weight on file to calculate BMI.  Doing well.  No concerns today.  No vaginal bleeding, LOF, contractions.  No HA, RUQ pain, N/V, visual changes.    Group B Strep was done  Transabdominal ultrasound was performed to determine presentation.  A viable intrauterine pregnancy was seen.  The fetus is noted in VERTEX .  Fetal heart motion was visualized at 146 bpm.    Normal Amniotic Fluid Volume is present.  Questions asked and answered.    Emiliano Clark MD FACOG

## 2022-11-28 NOTE — PATIENT INSTRUCTIONS
If you have any questions regarding your visit, Please contact your care team.     [employer]: [phone]    To Schedule an Appointment 24/7  Call: [phone] HOU[facility] TELEPHONE NUMBER     Emiliano Clark MD  Medical Director    Gene Pearson-NORA Palma-Surgery Scheduler  Annabella-Surgery Scheduler               Tuesday-[place]  7:30 a.m-4:30 p.m    Thursday-[place]  7:30 a.m-4:30 p.m    Typical Surgery Days: Tuesday or Friday [facility] [place]  [address] [place] [address]  PH: [phone]     Imaging Scheduling all locations  PH: [phone]     [facility] Labor and Delivery  [address] [place] [address] [place] [address]  PH: [phone]    [facility]  [address] [place] [address]  PH: [phone] Fax      **Surgeries** Our Surgery Schedulers will contact you to schedule. If you do not receive a call within 3 business days, please call [phone].    Urgent Care locations:  [facility] Monday-Friday  10 am - 8 pm  Saturday and Sunday   9 am - 5 pm  Monday-Friday   10 am - 8 pm  Saturday and Sunday   9 am - 5 pm   [phone]   If you need a medication refill, please contact your pharmacy. Please allow 3 business days for your refill to be completed.  As always, Thank you for trusting us with your healthcare needs!    see additional instructions from your care team below

## 2022-11-29 LAB — GP B STREP DNA SPEC QL NAA+PROBE: NEGATIVE

## 2022-12-01 ENCOUNTER — ANCILLARY PROCEDURE (OUTPATIENT)
Dept: MAMMOGRAPHY | Facility: CLINIC | Age: 32
End: 2022-12-01
Attending: OBSTETRICS & GYNECOLOGY
Payer: COMMERCIAL

## 2022-12-01 ENCOUNTER — ANCILLARY PROCEDURE (OUTPATIENT)
Dept: ULTRASOUND IMAGING | Facility: CLINIC | Age: 32
End: 2022-12-01
Attending: OBSTETRICS & GYNECOLOGY
Payer: COMMERCIAL

## 2022-12-01 DIAGNOSIS — R92.1 BREAST CALCIFICATION SEEN ON MAMMOGRAM: ICD-10-CM

## 2022-12-01 DIAGNOSIS — N64.59 BLEEDING FROM BREAST: ICD-10-CM

## 2022-12-01 PROCEDURE — 88360 TUMOR IMMUNOHISTOCHEM/MANUAL: CPT | Mod: GC | Performed by: PATHOLOGY

## 2022-12-01 PROCEDURE — 19084 BX BREAST ADD LESION US IMAG: CPT | Mod: RT | Performed by: STUDENT IN AN ORGANIZED HEALTH CARE EDUCATION/TRAINING PROGRAM

## 2022-12-01 PROCEDURE — 99207 MA BREAST SPECIMEN RIGHT: CPT | Performed by: STUDENT IN AN ORGANIZED HEALTH CARE EDUCATION/TRAINING PROGRAM

## 2022-12-01 PROCEDURE — 88305 TISSUE EXAM BY PATHOLOGIST: CPT | Mod: GC | Performed by: PATHOLOGY

## 2022-12-01 PROCEDURE — 76642 ULTRASOUND BREAST LIMITED: CPT | Mod: RT | Performed by: STUDENT IN AN ORGANIZED HEALTH CARE EDUCATION/TRAINING PROGRAM

## 2022-12-01 PROCEDURE — 88342 IMHCHEM/IMCYTCHM 1ST ANTB: CPT | Mod: XE | Performed by: PATHOLOGY

## 2022-12-01 PROCEDURE — 19083 BX BREAST 1ST LESION US IMAG: CPT | Mod: RT | Performed by: STUDENT IN AN ORGANIZED HEALTH CARE EDUCATION/TRAINING PROGRAM

## 2022-12-01 PROCEDURE — 77066 DX MAMMO INCL CAD BI: CPT | Performed by: STUDENT IN AN ORGANIZED HEALTH CARE EDUCATION/TRAINING PROGRAM

## 2022-12-01 PROCEDURE — G0279 TOMOSYNTHESIS, MAMMO: HCPCS | Performed by: STUDENT IN AN ORGANIZED HEALTH CARE EDUCATION/TRAINING PROGRAM

## 2022-12-06 ENCOUNTER — TELEPHONE (OUTPATIENT)
Dept: GENERAL RADIOLOGY | Facility: CLINIC | Age: 32
End: 2022-12-06

## 2022-12-06 ENCOUNTER — PATIENT OUTREACH (OUTPATIENT)
Dept: ONCOLOGY | Facility: CLINIC | Age: 32
End: 2022-12-06

## 2022-12-06 DIAGNOSIS — D05.11 DUCTAL CARCINOMA IN SITU (DCIS) OF RIGHT BREAST: Primary | ICD-10-CM

## 2022-12-06 NOTE — TELEPHONE ENCOUNTER
Spoke with patient regarding right breast biopsy results, which indicate DCIS and one benign lymph node. Notified patient that the radiologist's recommendation is surgical and oncologic consultations. Patient verbalized understanding of these results and all questions answered to her satisfaction. Pt is scheduled to meet with Dr. Ulloa on 12/8.

## 2022-12-06 NOTE — PROGRESS NOTES
New Patient Oncology Nurse Navigator Note     Referring provider: Emiliano Clark MD    Referring Clinic/Organization: [facility] (Ob/Gyn    Referred to (specialty:) Medical Oncology     Date Referral Received: December 6, 2022     Evaluation for:  Breast cancer     Clinical History (per Nurse review of records provided):      Meche is a 32 year old female in third trimester of pregnancy.  She reported a right breast lump for a few weeks with jaylene iron-colored discharge.  Diagnostic imaging was performed on 12/1/22 and on mammogram there are segmental pleomorphic calcifications right upper outerbreast. These extend from near base of nipple to mid-posterior depth. No suspicious finding left breast. On ultrasound, in the right breast 10:00 position 3 cm from nipple there is a large irregular hypoechoic mass with scattered calcifications, at least 4.2 x 2.2 x 4.0 cm.  Based on extent of calcifications on mammogram, this measurement underestimates disease. Additional scattered calcifications throughout right upper quadrant in real-time correlates with mammogram. At 9:00 position 9 cm from nipple there is a suspicious 1.4 x 0.7 x 1.3 cm circumscribed hypoechoic mass, potentially abnormal intramammary lymph node.  This is not well visualized on mammogram.    12/1/22 -   A. RIGHT breast, 10:00, 3 cm from nipple, calcifications, stereotactic core biopsy:  -Ductal carcinoma in situ (DCIS), nuclear grade 2, cribriform, solid and papillary types, with comedonecrosis and lobular involvement  -Background benign breast tissue with lactational changes  -Few small luminal calcifications in benign acini  -DCIS is estrogen receptor positive and progesterone receptor positive by immunohistochemistry (see biomarker reporting template below)  B. RIGHT breast, 9:00, 9 cm from nipple, ultrasound guided core biopsy:  -Benign intraparenchymal lymph node  -Benign breast tissue with lactational changes  -Negative for  atypia or malignancy    Records Location: See Bookmarked material     Records Needed: All breast imaging    Telephoned and spoke with Meche to assist in scheduling medical oncology consult.  We discussed specialists at the [facility] ([place] on 12/12) and options for providers in [place]. Writer received referral, reviewed for appropriate plan, and call transferred to New Patient Scheduling to finalize appointment with Dr. Lindsay on 12/16. Patient had appropriate questions about her diagnosis and asked about treatment with estrogen receptor positive breast cancer in premenopausal setting.  We did discuss overview, but encouraged her to write down questions for providers. Tracy Garcia RN              .

## 2022-12-07 ENCOUNTER — TELEPHONE (OUTPATIENT)
Dept: SURGERY | Facility: CLINIC | Age: 32
End: 2022-12-07

## 2022-12-07 NOTE — PROGRESS NOTES
[facility] Breast Surgery Consultation    HPI:  Meche Morrow is a 32 year old female who is seen in consultation at the request of Dr. Clark for evaluation of a newly diagnosed right breast ductal carcinoma in situ, grade 2, ER 41-50% positive, MS 51-60% positive at 10:00, 3cm FN measuring 4cm.     She had a diagnostic mammogram and US on 12/1/2022 which revealed segmental pleomorphic calcifications extending from the base of the nipple to the mid posterior breast. On US there is an irregular hypoechoic mass with scattered calcifications measuring 4.2cm. Additionally at 9:00, 9cm FN there is a 1.4cm hypoechoic mass which was also biopsied. In the right axilla there are indeterminate prominent lymph nodes.      Meche is currently 37 weeks pregnant. She reports she noticed bloody nipple discharge in mid November. It was frankly bloody a couple times and rust colored as well. She also felt a mass in her breast at this same time frame. She has not had prior breast biopsies or surgeries. She is otherwise very healthy. She is here with her .     Hormonal history:  menarche 13, currently pregnant with 1st child at 31yo,  Pre menopausal, >10 years OCP use, no HRT, no fertility treatment.     Family history of breast cancer: No  Family history of ovarian cancer:  No  Family history of colon cancer: No  Family history of prostate cancer: No      Past Medical History:   has a past medical history of HSV (herpes simplex virus) infection.      Current Outpatient Medications:      Prenatal Vit-Fe Fumarate-FA (PRENATAL MULTIVITAMIN W/IRON) 27-0.8 MG tablet, Take 1 tablet by mouth daily, Disp: , Rfl:      valACYclovir (VALTREX) 500 MG tablet, Take 1 tablet (500 mg) by mouth daily, Disp: 45 tablet, Rfl: 0    Past Surgical History:  Past Surgical History:   Procedure Laterality Date     NO HISTORY OF SURGERY           No Known Allergies     Social History:  Social History     Socioeconomic History      Marital status: Single     Spouse name: Not on file     Number of children: Not on file     Years of education: Not on file     Highest education level: Not on file   Occupational History     Not on file   Tobacco Use     Smoking status: Former     Types: Cigarettes     Quit date: 2022     Years since quittin.8     Smokeless tobacco: Never   Vaping Use     Vaping Use: Never used   Substance and Sexual Activity     Alcohol use: Not Currently     Drug use: Never     Sexual activity: Yes     Partners: Male   Other Topics Concern     Not on file   Social History Narrative     Not on file     Social Determinants of Health     Financial Resource Strain: Not on file   Food Insecurity: Not on file   Transportation Needs: Not on file   Physical Activity: Not on file   Stress: Not on file   Social Connections: Not on file   Intimate Partner Violence: Not on file   Housing Stability: Not on file        ROS:  The 10 point review of systems is negative other than noted in the HPI and above.    PE:  Vitals: LMP 2022   General appearance: well-nourished, sitting comfortably, no apparent distress  Psych: normal affect, pleasant  HEENT:  Head normocephalic and atraumatic, pupils equal and round, conjunctivae clear, mucous membranes moist, external ears and nose normal  Neck: Supple without thyromegaly or masses  Lungs: Respirations unlabored  Lymphatic: No cervical, or supraclavicular lymphadenopathy  Extremities: Without edema  Musculoskeletal:  Normal station and gait  Neurologic: nonfocal, grossly intact times four extremities, alert and oriented times three  Psychiatric: Mood and affect are appropriate  Skin: Without lesions or rashes    Breast:  A bilateral breast exam was performed in the supine position.. Bilateral breasts were palpated in a circumferential clockwise fashion including the supraclavicular and axillary areas.   Breasts are symmetric. Nipples everted and normal. Lactational changes bilaterally. On  the right upper outer breast is a 5cm mass. I am unable to feel a mass at 9:00. The mass is not well circumscribed at 10:00 and occupies a majority of the upper outer breast and abuts the areolar edge. No masses on the left.     Lymph:       No supraclavicular/infraclavicular adenopathy.   Axillary adenopathy: none    Assessment:  right breast ductal carcinoma in situ, grade 2, ER 41-50% positive, ID 51-60% positive at 10:00, 3cm FN measuring 4cm.       Plan:   Meche Morrow is a 32 year old female has newly diagnosed right breast cancer.  I reviewed the imaging and pathology reports with her and her  and explained the findings.  We talked about the fact that this is ductal carcinoma in situ  that is large in size and associated with a mass with a chance of an invasive carcinoma.  We discussed the receptor status. We discussed that breast cancer is treated in a multidisciplinary fashion and she will also meet with oncology as well as radiation oncology pending surgical decision making and final pathology results. She is meeting with Dr. Lindsay next week.     We discussed that her lymph nodes appear asymmetric on ultrasound and if they were involved by cancer, this would change our management. I would recommend US guided node biopsy to guide us. I would also like an additional biopsy of the mass as only 10% of DCIS presents as a mass and if there is an invasive component this would change our management.     We next discussed the surgical options for treatment.  I described the procedures for lumpectomy with sentinel lymph node biopsy and mastectomy with sentinel lymph node biopsy, possible axillary node dissection including the details of the procedures, the risks, anesthesia and expected recovery.  Given the span of the calcifications on the right and size of the mass, she is not a candidate for lumpectomy. I would recommend either simple mastectomy vs skin sparing mastectomy with reconstruction.      I advised that lymph node biopsy is recommended whenever we are dealing with invasive breast cancer and described the procedure for sentinel lymph node biopsy.  I would expect we have a plan for surgery within the next 4-5 weeks (and she would have delivered by this time) and would recommend SLNB with technetium and possibly methylene blue to identify sentinel nodes. We talked about the risk for lymphedema which is small with removal of only a few nodes, but certainly not zero.      We talked about post-lumpectomy radiation, the course and usual side effects. We discussed that with lumpectomy, radiation is typically recommended to decrease risk of recurrence. It may be necessary following mastectomy depending on final pathology and if jytosna involvement is present.     We also talked about post-mastectomy reconstruction and the stages involved. We also discussed the various types of mastectomy, including total, skin-sparing, and nipple-sparing mastectomy.  We reviewed that the nipple-sparing technique is cosmetic; sensation and contractility will likely be lost.  Meche  is NOT a candidate for nipple-sparing mastectomy from an oncologic perspective.  The option of having immediate versus delayed reconstruction was also discussed.   We reviewed that the advantages of immediate reconstruction includes superior cosmetics, as the skin is preserved.  She will see Dr. Earnestine Peace next week.     In addition, I have recommended genetic counseling.  She would be a candidate in that situation based on her young age at the time of diagnosis. The STAT panel was ordered.  The natural history of BRCA mutations and breast cancer were discussed with the patient. Should a deleterious mutation be identified, she would no longer be a good candidate for breast conservation.  We also reviewed the risk reduction benefits of a prophylactic mastectomy in this situation.       Plan:   US and biopsy right axillary lymph nodes and  repeat biopsy right breast mass  Plastic surgery referral (Dr. Peace 12/12/22)  Breast MRI following delivery of baby girl - messaged with Dr. Clark her OB/GYN anticipate induction at 39weeks  Tentative plan for right skin sparing mastectomy with right sentinel lymph node biopsy with reconstruction (pending consult with Dr. Peace) vs flat closure and delayed reconstruction.     75 minutes total time spent on the date of this encounter doing: chart review, review of test results, patient visit, physical exam, education, counseling, developing plan of care, and documenting.    Sandra Ulloa MD      Please route or send letter to:  Primary Care Provider (PCP) and Referring Provider

## 2022-12-07 NOTE — TELEPHONE ENCOUNTER
PREVISIT INFORMATION                                                    Meche KELLY Andieelizabethnancychriss Morrow is scheduled for future visit with Dr. Ulloa on 12/8/22 at the [facility] specialty clinics.    Reason for visit: DCIS  Referring provider: Breast Navigators  Have images been done?: Yes   Location: [facility]   Date: 12/1/22  Previous pathology reports?: Available in chart  Have previous office records been requested?: Available in chart      REVIEW                                                      New patient packet mailed to patient: No    PLAN/FOLLOW-UP NEEDED                                                      Previsit review complete.  Patient will see provider at future scheduled appointment.     Patient Reminders Given:    Informed patient to bring an updated list of allergies, medications, pharmacy details and insurance information. Directed patient to come to the 2nd floor, check-in D for their appointment. Informed patient to call back if appointment needs to be cancelled or rescheduled at [phone].    Reminded patient to bring any outside records regarding this appointment or have them faxed to clinic at [phone].    Tomeka Dc LPN

## 2022-12-08 ENCOUNTER — TELEPHONE (OUTPATIENT)
Dept: SURGERY | Facility: CLINIC | Age: 32
End: 2022-12-08

## 2022-12-08 ENCOUNTER — OFFICE VISIT (OUTPATIENT)
Dept: SURGERY | Facility: CLINIC | Age: 32
End: 2022-12-08
Payer: COMMERCIAL

## 2022-12-08 ENCOUNTER — PRENATAL OFFICE VISIT (OUTPATIENT)
Dept: OBGYN | Facility: CLINIC | Age: 32
End: 2022-12-08
Payer: COMMERCIAL

## 2022-12-08 ENCOUNTER — MEDICAL CORRESPONDENCE (OUTPATIENT)
Dept: HEALTH INFORMATION MANAGEMENT | Facility: CLINIC | Age: 32
End: 2022-12-08

## 2022-12-08 VITALS — HEIGHT: 61 IN | WEIGHT: 200 LBS | BODY MASS INDEX: 37.76 KG/M2

## 2022-12-08 VITALS
SYSTOLIC BLOOD PRESSURE: 135 MMHG | OXYGEN SATURATION: 95 % | BODY MASS INDEX: 38.13 KG/M2 | HEART RATE: 89 BPM | DIASTOLIC BLOOD PRESSURE: 75 MMHG | WEIGHT: 201.8 LBS

## 2022-12-08 DIAGNOSIS — N64.59 BLEEDING FROM BREAST: ICD-10-CM

## 2022-12-08 DIAGNOSIS — D05.11 DUCTAL CARCINOMA IN SITU (DCIS) OF RIGHT BREAST: ICD-10-CM

## 2022-12-08 DIAGNOSIS — D05.11 DUCTAL CARCINOMA IN SITU (DCIS) OF RIGHT BREAST: Primary | ICD-10-CM

## 2022-12-08 PROCEDURE — 99207 PR PRENATAL VISIT: CPT | Performed by: OBSTETRICS & GYNECOLOGY

## 2022-12-08 PROCEDURE — 99205 OFFICE O/P NEW HI 60 MIN: CPT | Performed by: SURGERY

## 2022-12-08 NOTE — PATIENT INSTRUCTIONS
If you have any questions regarding your visit, Please contact your care team.     [employer]: [phone]    To Schedule an Appointment 24/7  Call: [phone] HOURS TELEPHONE NUMBER     Emiliano Clark MD  Medical Director    Gene Pearson-NORA Palma-Surgery Scheduler  Annabella-Surgery Scheduler               Tuesday-[place]  7:30 a.m-4:30 p.m    Thursday-[place]  7:30 a.m-4:30 p.m    Typical Surgery Days: Tuesday or Friday [facility] [place]  [address] [place] [address]  PH: [phone]     Imaging Scheduling all locations  PH: [phone]     [facility] Labor and Delivery  [address] [place] [address] [place] [address]  PH: [phone]    [facility]  [address] [place] [address]  PH: [phone] Fax      **Surgeries** Our Surgery Schedulers will contact you to schedule. If you do not receive a call within 3 business days, please call [phone].    Urgent Care locations:  [facility] Monday-Friday  10 am - 8 pm  Saturday and Sunday   9 am - 5 pm  Monday-Friday   10 am - 8 pm  Saturday and Sunday   9 am - 5 pm   [phone]   If you need a medication refill, please contact your pharmacy. Please allow 3 business days for your refill to be completed.  As always, Thank you for trusting us with your healthcare needs!    see additional instructions from your care team below

## 2022-12-08 NOTE — NURSING NOTE
Breast Nurse Care Coordination:      I introduced self to patient and  Mikey, and explained my role of breast nurse coordinator. I accompanied Meche to her surgical consultation on 12/8/22 with Dr. Ulloa at the [facility].       Meche was given the new breast cancer patient packet which includes educational material and support resources such as American Cancer Society, Fighting Cancer through Diet and Lifestyle, [employer], [employer], Pathways and the [facility] Breast Cancer Support Group. I also enclosed a copy of her right breast biopsy pathology report (12/1/22).       At the end of the consultation, we reviewed Meche's plan of care and education.  The plan is for patient to meet with Dr. Lindsay (medical oncology) on 12/16/22 @ 1pm. Meche will need additional biopsies of her right breast/right lymph node. She will also be scheduled for a bilateral breast MRI after she delivers. Dr. Ulloa will call Meche with her MRI results about 24-48 hours after with results. Stat genetic panel ordered today, pt plans to stop by the lab this afternoon after her appointment with Dr. Clark. Consent signed and scanned into Epic. Meche will need a right breast mastectomy with right sentinel lymph node biopsy. She is considering bilateral mastectomy, depending on the genetics results. She is undecided on whether she would pursue reconstruction but would like to see a Plastic Surgeon to discuss. We discussed for surgery she will need a pre-op exam with her primary care provider within 30 days before surgery.     I gave patient Samir educational materials regarding Exercises After Breast Surgery, [place] Lymph Node Biopsy, and Mastectomy.  Informed patient for mastectomy surgery, she will want to have two good post mastectomy garments that open in the front to wear the 2 weeks following her surgery. I gave patient information on different options to  purchase post mastectomy garments. Prescription to [facility] faxed.      I answered her questions and encouraged patient to call me back with any future questions or concerns.  Meche has my contact information, and knows to contact me in the future with any questions or concerns.     Beatriz Myles 12/8/2022 9:27 AM    Beatriz Myles, RN, BSN, PHN  Breast Care Nurse Coordinator  [facility]  [phone]

## 2022-12-08 NOTE — TELEPHONE ENCOUNTER
Orders entered by Dr. Ulloa for US guided biopsy. Routing to RN in breast imaging to schedule.    Tomeka Dc LPN

## 2022-12-08 NOTE — PROGRESS NOTES
Patient presents for routine prenatal visit at 37w4d.  Patient with concerns.  She was diagnosed with DCIS in the breast, although they haven't ruled out breast cancer.  Her surgeon wants to proceed as quickly with surgery (and any other necessary) treatment. Discussed IOL at 39 weeks and she agrees.  PE: See OB vitals  Body mass index is 38.13 kg/m .  No vaginal bleeding, LOF, contractions.  No HA, RUQ pain, N/V, visual changes.  Questions asked and answered.  Plan Dilapan placement 12/18 and then IOL 12/19    Emiliano Clark MD FACOG

## 2022-12-08 NOTE — NURSING NOTE
"Meche Morrow's goals for this visit include:   Chief Complaint   Patient presents with     Consult     Breast cancer       She requests these members of her care team be copied on today's visit information: Yes  PCP: [facility]    Referring Provider:  [place] [address]    Ht 1.549 m (5' 1\")   Wt 90.7 kg (200 lb)   LMP 03/20/2022   BMI 37.79 kg/m      Do you need any medication refills at today's visit? No    Tomeka Dc LPN      "

## 2022-12-08 NOTE — LETTER
12/8/2022         RE: Meche Morrow  [address] [facility] [address]        Dear Colleague,    Thank you for referring your patient, Meche Morrow, to the [facility]. Please see a copy of my visit note below.    [facility] Breast Surgery Consultation    HPI:  Meche Morrow is a 32 year old female who is seen in consultation at the request of Dr. Clark for evaluation of a newly diagnosed right breast ductal carcinoma in situ, grade 2, ER 41-50% positive, AK 51-60% positive at 10:00, 3cm FN measuring 4cm.     She had a diagnostic mammogram and US on 12/1/2022 which revealed segmental pleomorphic calcifications extending from the base of the nipple to the mid posterior breast. On US there is an irregular hypoechoic mass with scattered calcifications measuring 4.2cm. Additionally at 9:00, 9cm FN there is a 1.4cm hypoechoic mass which was also biopsied. In the right axilla there are indeterminate prominent lymph nodes.      Meche is currently 37 weeks pregnant. She reports she noticed bloody nipple discharge in mid November. It was frankly bloody a couple times and rust colored as well. She also felt a mass in her breast at this same time frame. She has not had prior breast biopsies or surgeries. She is otherwise very healthy. She is here with her .     Hormonal history:  menarche 13, currently pregnant with 1st child at 33yo,  Pre menopausal, >10 years OCP use, no HRT, no fertility treatment.     Family history of breast cancer: No  Family history of ovarian cancer:  No  Family history of colon cancer: No  Family history of prostate cancer: No      Past Medical History:   has a past medical history of HSV (herpes simplex virus) infection.      Current Outpatient Medications:      Prenatal Vit-Fe Fumarate-FA (PRENATAL MULTIVITAMIN W/IRON) 27-0.8 MG tablet, Take 1 tablet by mouth daily, Disp: , Rfl:      valACYclovir (VALTREX) 500 MG  tablet, Take 1 tablet (500 mg) by mouth daily, Disp: 45 tablet, Rfl: 0    Past Surgical History:  Past Surgical History:   Procedure Laterality Date     NO HISTORY OF SURGERY           No Known Allergies     Social History:  Social History     Socioeconomic History     Marital status: Single     Spouse name: Not on file     Number of children: Not on file     Years of education: Not on file     Highest education level: Not on file   Occupational History     Not on file   Tobacco Use     Smoking status: Former     Types: Cigarettes     Quit date: 2022     Years since quittin.8     Smokeless tobacco: Never   Vaping Use     Vaping Use: Never used   Substance and Sexual Activity     Alcohol use: Not Currently     Drug use: Never     Sexual activity: Yes     Partners: Male   Other Topics Concern     Not on file   Social History Narrative     Not on file     Social Determinants of Health     Financial Resource Strain: Not on file   Food Insecurity: Not on file   Transportation Needs: Not on file   Physical Activity: Not on file   Stress: Not on file   Social Connections: Not on file   Intimate Partner Violence: Not on file   Housing Stability: Not on file        ROS:  The 10 point review of systems is negative other than noted in the HPI and above.    PE:  Vitals: LMP 2022   General appearance: well-nourished, sitting comfortably, no apparent distress  Psych: normal affect, pleasant  HEENT:  Head normocephalic and atraumatic, pupils equal and round, conjunctivae clear, mucous membranes moist, external ears and nose normal  Neck: Supple without thyromegaly or masses  Lungs: Respirations unlabored  Lymphatic: No cervical, or supraclavicular lymphadenopathy  Extremities: Without edema  Musculoskeletal:  Normal station and gait  Neurologic: nonfocal, grossly intact times four extremities, alert and oriented times three  Psychiatric: Mood and affect are appropriate  Skin: Without lesions or rashes    Breast:  A  bilateral breast exam was performed in the supine position.. Bilateral breasts were palpated in a circumferential clockwise fashion including the supraclavicular and axillary areas.   Breasts are symmetric. Nipples everted and normal. Lactational changes bilaterally. On the right upper outer breast is a 5cm mass. I am unable to feel a mass at 9:00. The mass is not well circumscribed at 10:00 and occupies a majority of the upper outer breast and abuts the areolar edge. No masses on the left.     Lymph:       No supraclavicular/infraclavicular adenopathy.   Axillary adenopathy: none    Assessment:  right breast ductal carcinoma in situ, grade 2, ER 41-50% positive, KY 51-60% positive at 10:00, 3cm FN measuring 4cm.       Plan:   Meche Morrow is a 32 year old female has newly diagnosed right breast cancer.  I reviewed the imaging and pathology reports with her and her  and explained the findings.  We talked about the fact that this is ductal carcinoma in situ  that is large in size and associated with a mass with a chance of an invasive carcinoma.  We discussed the receptor status. We discussed that breast cancer is treated in a multidisciplinary fashion and she will also meet with oncology as well as radiation oncology pending surgical decision making and final pathology results. She is meeting with Dr. Lindsay next week.     We discussed that her lymph nodes appear asymmetric on ultrasound and if they were involved by cancer, this would change our management. I would recommend US guided node biopsy to guide us. I would also like an additional biopsy of the mass as only 10% of DCIS presents as a mass and if there is an invasive component this would change our management.     We next discussed the surgical options for treatment.  I described the procedures for lumpectomy with sentinel lymph node biopsy and mastectomy with sentinel lymph node biopsy, possible axillary node dissection including the  details of the procedures, the risks, anesthesia and expected recovery.  Given the span of the calcifications on the right and size of the mass, she is not a candidate for lumpectomy. I would recommend either simple mastectomy vs skin sparing mastectomy with reconstruction.     I advised that lymph node biopsy is recommended whenever we are dealing with invasive breast cancer and described the procedure for sentinel lymph node biopsy.  I would expect we have a plan for surgery within the next 4-5 weeks (and she would have delivered by this time) and would recommend SLNB with technetium and possibly methylene blue to identify sentinel nodes. We talked about the risk for lymphedema which is small with removal of only a few nodes, but certainly not zero.      We talked about post-lumpectomy radiation, the course and usual side effects. We discussed that with lumpectomy, radiation is typically recommended to decrease risk of recurrence. It may be necessary following mastectomy depending on final pathology and if jyotsna involvement is present.     We also talked about post-mastectomy reconstruction and the stages involved. We also discussed the various types of mastectomy, including total, skin-sparing, and nipple-sparing mastectomy.  We reviewed that the nipple-sparing technique is cosmetic; sensation and contractility will likely be lost.  Meche  is NOT a candidate for nipple-sparing mastectomy from an oncologic perspective.  The option of having immediate versus delayed reconstruction was also discussed.   We reviewed that the advantages of immediate reconstruction includes superior cosmetics, as the skin is preserved.  She will see Dr. Earnestine Peace next week.     In addition, I have recommended genetic counseling.  She would be a candidate in that situation based on her young age at the time of diagnosis. The STAT panel was ordered.  The natural history of BRCA mutations and breast cancer were discussed with the  patient. Should a deleterious mutation be identified, she would no longer be a good candidate for breast conservation.  We also reviewed the risk reduction benefits of a prophylactic mastectomy in this situation.       Plan:   US and biopsy right axillary lymph nodes and repeat biopsy right breast mass  Plastic surgery referral (Dr. Peace 12/12/22)  Breast MRI following delivery of baby girl - messaged with Dr. Clark her OB/GYN anticipate induction at 39weeks  Tentative plan for right skin sparing mastectomy with right sentinel lymph node biopsy with reconstruction (pending consult with Dr. Peace) vs flat closure and delayed reconstruction.     75 minutes total time spent on the date of this encounter doing: chart review, review of test results, patient visit, physical exam, education, counseling, developing plan of care, and documenting.    Sandra Ulloa MD      Please route or send letter to:  Primary Care Provider (PCP) and Referring Provider           Again, thank you for allowing me to participate in the care of your patient.        Sincerely,        Sandra Ulloa MD

## 2022-12-09 ENCOUNTER — ANCILLARY PROCEDURE (OUTPATIENT)
Dept: MAMMOGRAPHY | Facility: CLINIC | Age: 32
End: 2022-12-09
Attending: SURGERY
Payer: COMMERCIAL

## 2022-12-09 ENCOUNTER — ANCILLARY PROCEDURE (OUTPATIENT)
Dept: ULTRASOUND IMAGING | Facility: CLINIC | Age: 32
End: 2022-12-09
Attending: SURGERY
Payer: COMMERCIAL

## 2022-12-09 DIAGNOSIS — D05.11 DUCTAL CARCINOMA IN SITU (DCIS) OF RIGHT BREAST: ICD-10-CM

## 2022-12-09 PROCEDURE — 19083 BX BREAST 1ST LESION US IMAG: CPT | Mod: RT | Performed by: RADIOLOGY

## 2022-12-09 PROCEDURE — 88305 TISSUE EXAM BY PATHOLOGIST: CPT | Mod: 26 | Performed by: PATHOLOGY

## 2022-12-09 PROCEDURE — 38505 NEEDLE BIOPSY LYMPH NODES: CPT | Mod: RT | Performed by: RADIOLOGY

## 2022-12-09 PROCEDURE — 76942 ECHO GUIDE FOR BIOPSY: CPT | Mod: XU | Performed by: RADIOLOGY

## 2022-12-12 LAB
PATH REPORT.COMMENTS IMP SPEC: ABNORMAL
PATH REPORT.COMMENTS IMP SPEC: YES
PATH REPORT.COMMENTS IMP SPEC: YES
PATH REPORT.FINAL DX SPEC: ABNORMAL
PATH REPORT.FINAL DX SPEC: ABNORMAL
PATH REPORT.GROSS SPEC: ABNORMAL
PATH REPORT.GROSS SPEC: ABNORMAL
PATH REPORT.MICROSCOPIC SPEC OTHER STN: ABNORMAL
PATH REPORT.MICROSCOPIC SPEC OTHER STN: ABNORMAL
PATH REPORT.RELEVANT HX SPEC: ABNORMAL
PATH REPORT.RELEVANT HX SPEC: ABNORMAL
PATHOLOGY SYNOPTIC REPORT: ABNORMAL
PHOTO IMAGE: ABNORMAL
PHOTO IMAGE: ABNORMAL

## 2022-12-13 ENCOUNTER — TELEPHONE (OUTPATIENT)
Dept: SURGERY | Facility: CLINIC | Age: 32
End: 2022-12-13

## 2022-12-13 ENCOUNTER — PREP FOR PROCEDURE (OUTPATIENT)
Dept: SURGERY | Facility: CLINIC | Age: 32
End: 2022-12-13

## 2022-12-13 DIAGNOSIS — D05.11 DUCTAL CARCINOMA IN SITU (DCIS) OF RIGHT BREAST: Primary | ICD-10-CM

## 2022-12-13 NOTE — TELEPHONE ENCOUNTER
Meche called as she was disconnected from Dr. Ulloa. We discussed her stat genetics, she is planning on calling the lab today to schedule at [place].     We are tentatively looking at 1/9/22 for a surgery date. A MRI was scheduled for 12/27/22 @ 8am (arrive @ 7:30am) at [facility]. I will send a Skipo Message to Meche per her request. Unfortunately [place] [facility] was booking out until January 9th, Meche was willing to go to [facility] if needed.     Beatriz's prescription faxed, pt will schedule an appt as well as a pre-op exam with her primary care provider.     Pt verbalized understanding and agreeable to plan.    Beatriz Myles 12/13/2022 10:18 AM    Beatriz Myles, RN, BSN, PHN  Breast Care Nurse Coordinator  [facility]- [facility] [place]  [phone]

## 2022-12-13 NOTE — CONFIDENTIAL NOTE
I called Meche and let her know her biopsies from 12/9 revealed DCIS on the breast biopsy and the node biopsy was benign which is excellent news. She is scheduled for induction on Sunday 12/18. She has met with Dr. Peace and plan for bilateral SSM with right SLNB. We will work on scheduling this asap, we are working on 1/9/2022 tentatively. We did get cut off during our conversation and phone is going directly to voicemail with trying her back. She can call Beatriz with any further questions. Guillermina is working on coordinating surgery timing.     Sandra Ulloa MD  [facility]  [phone]

## 2022-12-14 ENCOUNTER — PRENATAL OFFICE VISIT (OUTPATIENT)
Dept: OBGYN | Facility: CLINIC | Age: 32
End: 2022-12-14
Payer: COMMERCIAL

## 2022-12-14 VITALS
OXYGEN SATURATION: 95 % | HEART RATE: 90 BPM | DIASTOLIC BLOOD PRESSURE: 86 MMHG | BODY MASS INDEX: 38.56 KG/M2 | WEIGHT: 204.1 LBS | SYSTOLIC BLOOD PRESSURE: 138 MMHG

## 2022-12-14 DIAGNOSIS — Z34.00 SUPERVISION OF NORMAL FIRST PREGNANCY, ANTEPARTUM: Primary | ICD-10-CM

## 2022-12-14 PROBLEM — C50.919 BREAST CANCER (H): Status: ACTIVE | Noted: 2022-12-14

## 2022-12-14 PROCEDURE — 99207 PR PRENATAL VISIT: CPT | Performed by: OBSTETRICS & GYNECOLOGY

## 2022-12-14 NOTE — TELEPHONE ENCOUNTER
RECORDS STATUS - BREAST    RECORDS REQUESTED FROM: Epic   DATE REQUESTED:    NOTES DETAILS STATUS   OFFICE NOTE from referring provider [facility] Emiliano Clark MD in MG XRAY   OFFICE NOTE from surgeon [facility] Dr. Ulloa: 22   MEDICATION LIST [facility]    LABS     PATHOLOGY REPORTS  (Tissue diagnosis, Stage, ER/SC percentage positive and intensity of staining, HER2 IHC, FISH, and all biopsies from breast and any distant metastasis)                 Epic 22, 22: Surg Path   GENONOMIC TESTING     TYPE:   (Next Generation Sequencing, including Foundation One testing, and Oncotype score) Epic 22   IMAGING (NEED IMAGES & REPORT)     MAMMO PACS [facility]   ULTRASOUND PACS Epic

## 2022-12-14 NOTE — PATIENT INSTRUCTIONS
If you have any questions regarding your visit, Please contact your care team.     [employer]: [phone]    To Schedule an Appointment 24/7  Call: [phone] TELEPHONE NUMBER     Emiliano Clark MD  Medical Director    Gene Pearson-NORA Palma-Surgery Scheduler  Annabella-Surgery Scheduler               Tuesday-[place]  7:30 a.m-4:30 p.m    Thursday-[place]  7:30 a.m-4:30 p.m    Typical Surgery Days: Tuesday or Friday [facility] [place]  [address] [place] [address]  PH: [phone]     Imaging Scheduling all locations  PH: [phone]     [facility] Labor and Delivery  [address] [place] [address] [place] [address]  PH: [phone]    [facility]  [address] [place] [address]  PH: [phone] Fax      **Surgeries** Our Surgery Schedulers will contact you to schedule. If you do not receive a call within 3 business days, please call [phone].    Urgent Care locations:  [facility] Monday-Friday  10 am - 8 pm  Saturday and Sunday   9 am - 5 pm  Monday-Friday   10 am - 8 pm  Saturday and Sunday   9 am - 5 pm   [phone]   If you need a medication refill, please contact your pharmacy. Please allow 3 business days for your refill to be completed.  As always, Thank you for trusting us with your healthcare needs!    see additional instructions from your care team below

## 2022-12-14 NOTE — PROGRESS NOTES
Patient presents for routine prenatal visit at 38w3d.  Patient without complaint.   PE: See OB vitals  There is no height or weight on file to calculate BMI.  Doing well.  No concerns today.  No vaginal bleeding, LOF, contractions.  No HA, RUQ pain, N/V, visual changes.  Questions asked and answered.  Scheduled for Dilipan placement Sunday and then IOL Monday    Emiliano Clark MD FACOG

## 2022-12-16 ENCOUNTER — ONCOLOGY VISIT (OUTPATIENT)
Dept: ONCOLOGY | Facility: CLINIC | Age: 32
End: 2022-12-16
Payer: COMMERCIAL

## 2022-12-16 VITALS
DIASTOLIC BLOOD PRESSURE: 83 MMHG | HEIGHT: 61 IN | BODY MASS INDEX: 38.53 KG/M2 | WEIGHT: 204.1 LBS | RESPIRATION RATE: 18 BRPM | OXYGEN SATURATION: 93 % | SYSTOLIC BLOOD PRESSURE: 125 MMHG | HEART RATE: 94 BPM

## 2022-12-16 DIAGNOSIS — D05.11 DUCTAL CARCINOMA IN SITU (DCIS) OF RIGHT BREAST: ICD-10-CM

## 2022-12-16 PROCEDURE — 99204 OFFICE O/P NEW MOD 45 MIN: CPT | Performed by: INTERNAL MEDICINE

## 2022-12-16 ASSESSMENT — PAIN SCALES - GENERAL: PAINLEVEL: NO PAIN (0)

## 2022-12-16 NOTE — LETTER
2022         RE: Meche Morrow  [address] [facility] [address]        Dear Colleague,    Thank you for referring your patient, Meche Morrow, to the [facility]. Please see a copy of my visit note below.    [facility]  MEDICAL ONCOLOGY    NEW PATIENT VISIT NOTE    Reason for consultation: DCIS    Referring Provider: Sandra Ulloa MD    Oncology Treatment Summary  1. 39 weeks pregnant (due to be induced 22)  2. Late October early November spontaneous bloody nipple discharge.  3. 22 Mammogram/ultrasound RUQ 4 cm area of calcifications, on ultrasound 4.2 x 2.2 x 4 cm mass with a 2nd RUQ at 9:00 1.4 x 0.7 x 1.3 cm mass with slighly prominent right axillary lymph nodes  4. bx 10:00 DCIS G2 solid and papillary with comedonecrosis, ER+OK+, 2nd bx at 9:00 was benign  5. 22 LN bx was benign with another bx at 10:00 with DCIS     HISTORY OF PRESENTING ILLNESS  Pleasant 31 yo here with her  for a new dx of DCIS. She is scheduled for bilateral mastectomies with Dr Ulloa 22 and reconstruction with Dr Peace .      PAST MEDICAL HISTORY   - 38 week 4 days, HSV, wisdom teeth removal.      CURRENT OUTPATIENT MEDICATIONS  Current Outpatient Medications   Medication     Prenatal Vit-Fe Fumarate-FA (PRENATAL MULTIVITAMIN W/IRON) 27-0.8 MG tablet     valACYclovir (VALTREX) 500 MG tablet     No current facility-administered medications for this visit.        ALLERGIES   No Known Allergies     SOCIAL HISTORY   here with Hank, no tobacco or etoh, works at [employer], lives in [place]     FAMILY HISTORY  p aunt with uterine cancer at older age     REVIEW OF SYSTEMS  Review Of Systems  Skin: negative  Eyes: negative  Ears/Nose/Throat: negative  Respiratory: No shortness of breath, dyspnea on exertion, cough, or hemoptysis  Cardiovascular: negative  Gastrointestinal: negative  Genitourinary:  negative  Musculoskeletal: negative  Neurologic: negative  Psychiatric: negative  Hematologic/Lymphatic/Immunologic: negative  Endocrine: negative      PHYSICAL EXAM  B/P: 125/83, T: Data Unavailable, P: 94, R: 18  Wt Readings from Last 3 Encounters:   12/16/22 92.6 kg (204 lb 1.6 oz)   12/14/22 92.6 kg (204 lb 1.6 oz)   12/08/22 91.5 kg (201 lb 12.8 oz)       ECOG PPS0    Physical Exam  Vitals reviewed.   Constitutional:       Appearance: Normal appearance. She is normal weight.   HENT:      Head: Normocephalic and atraumatic.   Eyes:      Extraocular Movements: Extraocular movements intact.      Conjunctiva/sclera: Conjunctivae normal.      Pupils: Pupils are equal, round, and reactive to light.   Cardiovascular:      Rate and Rhythm: Normal rate and regular rhythm.   Pulmonary:      Effort: Pulmonary effort is normal.      Breath sounds: Normal breath sounds.   Musculoskeletal:      Cervical back: Normal range of motion.   Skin:     General: Skin is warm.   Neurological:      General: No focal deficit present.      Mental Status: She is alert and oriented to person, place, and time. Mental status is at baseline.   Psychiatric:         Mood and Affect: Mood normal.         Behavior: Behavior normal.         Thought Content: Thought content normal.         Judgment: Judgment normal.         bilateral axilla benign.     LABORATORY AND IMAGING STUDIES  No results for input(s): NA, POTASSIUM, CHLORIDE, CO2, ANIONGAP, BUN, CR, GLC, PHILLIP in the last 87484 hours.  No results for input(s): MAG, PHOS in the last 69470 hours.  Recent Labs   Lab Test 09/20/22  1543 06/03/22  1451   WBC  --  9.5   HGB 12.7 13.0   PLT  --  300   MCV  --  85     No results for input(s): BILITOTAL, ALKPHOS, ALT, AST, ALBUMIN, LDH in the last 84787 hours.  No results found for: TSH  No results for input(s): CEA in the last 64803 hours.  Results for orders placed or performed in visit on 12/09/22   MA Post Procedure Right    Addendum: 12/13/2022     Addendum 1: Pathology results  A. Lymph node, RIGHT axillary, ultrasound guided core biopsy:  -Benign lymph node      B. RIGHT breast, 10:00, 3 cm from nipple, ultrasound guided core  biopsy:  Ductal carcinoma in situ (DCIS), nuclear grades 2 and 3, with focal  apocrine features, cribriform, micropapillary and papillary types,  with comedonecrosis and lobular involvement; Calcifications associated  with DCIS  Comment:  The DCIS in specimen B is morphologically similar to the DCIS in the  patient's recent prior RIGHT breast 10:00 3 cm from nipple core biopsy  material (see report [id number], specimen A), although a minor  component of higher nuclear grade DCIS is seen in the current  specimen, with more striking apocrine features.  No invasive carcinoma  is identified.    Pathology results are concordant with radiological findings.    RECOMMENDED FOLLOW-UP: Oncological Follow-up.  Please note Grade 3 DCIS.    REENA STACK MD         SYSTEM ID:  [id number]      Narrative    EXAMINATION: MA POST PROCEDURE RIGHT, US BREAST BIOPSY CORE NEEDLE  RIGHT, US BREAST BIOPSY CORE LYMPH NODE RIGHT, 12/9/2022 3:27 PM     HISTORY: Biopsy proven DCIS in the right breast.  Repeat biopsy of  right breast mass and right axillary lymph node requested.    COMPARISON: 12/1/2022    CONSENT: The procedure, benefits and risks, were explained and  discussed with the patient. Risks included: infection, bleeding, and  the small possibility of even life threatening complications. Written  and verbal consent were obtained.    PROCEDURE: Using aseptic technique, up to 10 cc of 1% lidocaine  buffered with sodium bicarbonate for local anesthesia, ultrasound  guidance, and a biopsy needle were utilized to sample lesion. A  radiopaque biopsy marker was placed. Pressure was held over this area  for approximately 10 to 15 minutes until there was adequate  hemostasis. A dressing was placed and post biopsy care instructions  were discussed with the patient.  "There were no apparent complications.  The patient left our department in stable condition.  If multiple biopsies were performed, new equipment was used for each  site.    LESION A:  Location: Right , axilla position  Needle: 14 gauge core needle biopsy system  No. of passes: 2  Clip shape: BiomarC (shaped liked a \"barbell\") clip     LESION B:   Location: Right, 10:00 position  Needle: 12 gauge vacuum assisted needle biopsy system  No. of passes: 1  Clip shape: Hydromark clip     Post procedure mammogram was performed.      Impression    IMPRESSION: Uncomplicated ultrasound-guided core needle biopsies.    REENA STACK MD         SYSTEM ID:  [id number]        ASSESSMENT  AND RECOMMENDATIONS:    1. RIGHT breast DCIS Grade 2, solid papillary with comedonecrosis, ER+VA+ with what appears to be at least 4 cm of disease.  2. 39 week pregnancy    Plan    We discussed her newly diagnosed DCIS and reviewed the natural hx of breast cancer and specifically DCIS. She is scheduled for a RIGHT mastectomy and sentinel lymph node bx with a prophylactic LEFT mastectomy 1/9/23    I reviewed DCIS is treated with either a lumpectomy potentially followed by radiation vs mastectomy and the two of these are equivalent in overall survival. Given the extensive area of disease within the breast she will require a mastectomy. We discussed that while her bx all have DCIS she is at a risk of having microinvasive disease. A sentinel lymph node will be done at the time of surgery.    She also needs to see genetic counseling and I will place a referral.    She will see me back after her pathology is available to review the pathological findings and made further treatment decisions at that time.    Caryl Lindsay MD on 12/16/2022 at 3:59 PM        Again, thank you for allowing me to participate in the care of your patient.        Sincerely,        Caryl Lindsay MD    "

## 2022-12-16 NOTE — NURSING NOTE
"Oncology Rooming Note    December 16, 2022 1:11 PM   Meche Morrow is a 32 year old female who presents for:    Chief Complaint   Patient presents with     Oncology Clinic Visit     New patient     Initial Vitals: /83 (BP Location: Right arm)   Pulse 94   Resp 18   Ht 1.549 m (5' 0.98\")   Wt 92.6 kg (204 lb 1.6 oz)   LMP 03/20/2022   SpO2 93%   BMI 38.58 kg/m   Estimated body mass index is 38.58 kg/m  as calculated from the following:    Height as of this encounter: 1.549 m (5' 0.98\").    Weight as of this encounter: 92.6 kg (204 lb 1.6 oz). Body surface area is 2 meters squared.  No Pain (0) Comment: Data Unavailable   Patient's last menstrual period was 03/20/2022.  Allergies reviewed: Yes  Medications reviewed: Yes    Medications: Medication refills not needed today.  Pharmacy name entered into Kartela: [id number] - [place], [place] - [address]    Clinical concerns: New patient       Yanira Cook LPN            "

## 2022-12-16 NOTE — PROGRESS NOTES
[facility]  MEDICAL ONCOLOGY    NEW PATIENT VISIT NOTE    Reason for consultation: DCIS    Referring Provider: Sandra Ulloa MD    Oncology Treatment Summary  1. 39 weeks pregnant (due to be induced 22)  2. Late October early November spontaneous bloody nipple discharge.  3. 22 Mammogram/ultrasound RUQ 4 cm area of calcifications, on ultrasound 4.2 x 2.2 x 4 cm mass with a 2nd RUQ at 9:00 1.4 x 0.7 x 1.3 cm mass with slighly prominent right axillary lymph nodes  4. 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 22 LN bx was benign with another bx at 10:00 with DCIS     HISTORY OF PRESENTING ILLNESS  Pleasant 31 yo here with her  for a new dx of DCIS. She is scheduled for bilateral mastectomies with Dr Ulloa 22 and reconstruction with Dr Peace .      PAST MEDICAL HISTORY   - 38 week 4 days, HSV, wisdom teeth removal.      CURRENT OUTPATIENT MEDICATIONS  Current Outpatient Medications   Medication     Prenatal Vit-Fe Fumarate-FA (PRENATAL MULTIVITAMIN W/IRON) 27-0.8 MG tablet     valACYclovir (VALTREX) 500 MG tablet     No current facility-administered medications for this visit.        ALLERGIES   No Known Allergies     SOCIAL HISTORY   here with Hank, no tobacco or etoh, works at [employer], lives in [place]     FAMILY HISTORY  p aunt with uterine cancer at older age     REVIEW OF SYSTEMS  Review Of Systems  Skin: negative  Eyes: negative  Ears/Nose/Throat: negative  Respiratory: No shortness of breath, dyspnea on exertion, cough, or hemoptysis  Cardiovascular: negative  Gastrointestinal: negative  Genitourinary: negative  Musculoskeletal: negative  Neurologic: negative  Psychiatric: negative  Hematologic/Lymphatic/Immunologic: negative  Endocrine: negative      PHYSICAL EXAM  B/P: 125/83, T: Data Unavailable, P: 94, R: 18  Wt Readings from Last 3 Encounters:   22 92.6 kg (204 lb 1.6 oz)    12/14/22 92.6 kg (204 lb 1.6 oz)   12/08/22 91.5 kg (201 lb 12.8 oz)       ECOG PPS0    Physical Exam  Vitals reviewed.   Constitutional:       Appearance: Normal appearance. She is normal weight.   HENT:      Head: Normocephalic and atraumatic.   Eyes:      Extraocular Movements: Extraocular movements intact.      Conjunctiva/sclera: Conjunctivae normal.      Pupils: Pupils are equal, round, and reactive to light.   Cardiovascular:      Rate and Rhythm: Normal rate and regular rhythm.   Pulmonary:      Effort: Pulmonary effort is normal.      Breath sounds: Normal breath sounds.   Musculoskeletal:      Cervical back: Normal range of motion.   Skin:     General: Skin is warm.   Neurological:      General: No focal deficit present.      Mental Status: She is alert and oriented to person, place, and time. Mental status is at baseline.   Psychiatric:         Mood and Affect: Mood normal.         Behavior: Behavior normal.         Thought Content: Thought content normal.         Judgment: Judgment normal.         bilateral axilla benign.     LABORATORY AND IMAGING STUDIES  No results for input(s): NA, POTASSIUM, CHLORIDE, CO2, ANIONGAP, BUN, CR, GLC, PHLILIP in the last 30562 hours.  No results for input(s): MAG, PHOS in the last 30493 hours.  Recent Labs   Lab Test 09/20/22  1543 06/03/22  1451   WBC  --  9.5   HGB 12.7 13.0   PLT  --  300   MCV  --  85     No results for input(s): BILITOTAL, ALKPHOS, ALT, AST, ALBUMIN, LDH in the last 48740 hours.  No results found for: TSH  No results for input(s): CEA in the last 96869 hours.  Results for orders placed or performed in visit on 12/09/22   MA Post Procedure Right    Addendum: 12/13/2022    Addendum 1: Pathology results  A. Lymph node, RIGHT axillary, ultrasound guided core biopsy:  -Benign lymph node      B. RIGHT breast, 10:00, 3 cm from nipple, ultrasound guided core  biopsy:  Ductal carcinoma in situ (DCIS), nuclear grades 2 and 3, with focal  apocrine features,  cribriform, micropapillary and papillary types,  with comedonecrosis and lobular involvement; Calcifications associated  with DCIS  Comment:  The DCIS in specimen B is morphologically similar to the DCIS in the  patient's recent prior RIGHT breast 10:00 3 cm from nipple core biopsy  material (see report [id number], specimen A), although a minor  component of higher nuclear grade DCIS is seen in the current  specimen, with more striking apocrine features.  No invasive carcinoma  is identified.    Pathology results are concordant with radiological findings.    RECOMMENDED FOLLOW-UP: Oncological Follow-up.  Please note Grade 3 DCIS.    REENA STACK MD         SYSTEM ID:  [id number]      Narrative    EXAMINATION: MA POST PROCEDURE RIGHT, US BREAST BIOPSY CORE NEEDLE  RIGHT, US BREAST BIOPSY CORE LYMPH NODE RIGHT, 12/9/2022 3:27 PM     HISTORY: Biopsy proven DCIS in the right breast.  Repeat biopsy of  right breast mass and right axillary lymph node requested.    COMPARISON: 12/1/2022    CONSENT: The procedure, benefits and risks, were explained and  discussed with the patient. Risks included: infection, bleeding, and  the small possibility of even life threatening complications. Written  and verbal consent were obtained.    PROCEDURE: Using aseptic technique, up to 10 cc of 1% lidocaine  buffered with sodium bicarbonate for local anesthesia, ultrasound  guidance, and a biopsy needle were utilized to sample lesion. A  radiopaque biopsy marker was placed. Pressure was held over this area  for approximately 10 to 15 minutes until there was adequate  hemostasis. A dressing was placed and post biopsy care instructions  were discussed with the patient. There were no apparent complications.  The patient left our department in stable condition.  If multiple biopsies were performed, new equipment was used for each  site.    LESION A:  Location: Right , axilla position  Needle: 14 gauge core needle biopsy system  No. of passes:  "2  Clip shape: BiomarC (shaped liked a \"barbell\") clip     LESION B:   Location: Right, 10:00 position  Needle: 12 gauge vacuum assisted needle biopsy system  No. of passes: 1  Clip shape: Hydromark clip     Post procedure mammogram was performed.      Impression    IMPRESSION: Uncomplicated ultrasound-guided core needle biopsies.    REENA STACK MD         SYSTEM ID:  [id number]        ASSESSMENT  AND RECOMMENDATIONS:    1. RIGHT breast DCIS Grade 2, solid papillary with comedonecrosis, ER+KS+ with what appears to be at least 4 cm of disease.  2. 39 week pregnancy    Plan    We discussed her newly diagnosed DCIS and reviewed the natural hx of breast cancer and specifically DCIS. She is scheduled for a RIGHT mastectomy and sentinel lymph node bx with a prophylactic LEFT mastectomy 1/9/23    I reviewed DCIS is treated with either a lumpectomy potentially followed by radiation vs mastectomy and the two of these are equivalent in overall survival. Given the extensive area of disease within the breast she will require a mastectomy. We discussed that while her bx all have DCIS she is at a risk of having microinvasive disease. A sentinel lymph node will be done at the time of surgery.    She also needs to see genetic counseling and I will place a referral.    She will see me back after her pathology is available to review the pathological findings and made further treatment decisions at that time.    Caryl Lindsay MD on 12/16/2022 at 3:59 PM    "

## 2022-12-17 ENCOUNTER — TELEPHONE (OUTPATIENT)
Dept: SURGERY | Facility: CLINIC | Age: 32
End: 2022-12-17

## 2022-12-17 DIAGNOSIS — D05.11 DUCTAL CARCINOMA IN SITU (DCIS) OF RIGHT BREAST: Primary | ICD-10-CM

## 2022-12-17 NOTE — TELEPHONE ENCOUNTER
Type of surgery: Bilateral skin sparing mastectomy with right sentinel lymph node biopsy  Location of surgery: [facility]  Date and time of surgery: 1//9/23 at 7:30am  Surgeon: Dr. Sandra Ulloa  Pre-Op Appt Date: patient to schedule  Post-Op Appt Date: patient to schedule   Packet sent out: Yes  Pre-cert/Authorization completed:  Not Applicable  Date: 12/17/22

## 2022-12-19 ENCOUNTER — TELEPHONE (OUTPATIENT)
Dept: SURGERY | Facility: CLINIC | Age: 32
End: 2022-12-19

## 2022-12-19 NOTE — TELEPHONE ENCOUNTER
Type of surgery: Bilateral skin sparing mastectomy with right sentinel lymph node biopsy  Location of surgery: [facility]  Date and time of surgery: 1/9/23 at 7:30am  Surgeon: Dr. Sandra Ulloa  Pre-Op Appt Date: patient to schedule  Post-Op Appt Date: patient to schedule   Packet sent out: Yes  Pre-cert/Authorization completed:  Not Applicable  Date: 12/19/22

## 2022-12-27 ENCOUNTER — HOSPITAL ENCOUNTER (OUTPATIENT)
Dept: MRI IMAGING | Facility: CLINIC | Age: 32
Discharge: HOME | End: 2022-12-27
Attending: SURGERY | Admitting: SURGERY
Payer: COMMERCIAL

## 2022-12-27 ENCOUNTER — TELEPHONE (OUTPATIENT)
Dept: SURGERY | Facility: CLINIC | Age: 32
End: 2022-12-27

## 2022-12-27 ENCOUNTER — TRANSFERRED RECORDS (OUTPATIENT)
Dept: HEALTH INFORMATION MANAGEMENT | Facility: CLINIC | Age: 32
End: 2022-12-27

## 2022-12-27 DIAGNOSIS — D05.11 DUCTAL CARCINOMA IN SITU (DCIS) OF RIGHT BREAST: ICD-10-CM

## 2022-12-27 PROCEDURE — A9585 GADOBUTROL INJECTION: HCPCS | Performed by: SURGERY

## 2022-12-27 PROCEDURE — 255N000002 HC RX 255 OP 636: Performed by: SURGERY

## 2022-12-27 PROCEDURE — 999N000040 HC STATISTIC CONSULT NO CHARGE VASC ACCESS

## 2022-12-27 PROCEDURE — 999N000128 HC STATISTIC PERIPHERAL IV START W/O US GUIDANCE

## 2022-12-27 PROCEDURE — 77049 MRI BREAST C-+ W/CAD BI: CPT

## 2022-12-27 RX ORDER — GADOBUTROL 604.72 MG/ML
9 INJECTION INTRAVENOUS ONCE
Status: COMPLETED | OUTPATIENT
Start: 2022-12-27 | End: 2022-12-27

## 2022-12-27 RX ADMIN — GADOBUTROL 9 ML: 604.72 INJECTION INTRAVENOUS at 08:38

## 2022-12-27 NOTE — CONFIDENTIAL NOTE
I called and left a voicemail for Meche letting her know I was able to see her MRI and it looks as expected. Plan to proceed with surgery as scheduled on 1/9.     Sandra Ulloa MD  [facility]  [phone]

## 2023-01-02 ENCOUNTER — TELEPHONE (OUTPATIENT)
Dept: SURGERY | Facility: CLINIC | Age: 33
End: 2023-01-02

## 2023-01-02 NOTE — TELEPHONE ENCOUNTER
Breast Care Nurse Coordination:      Preoperative call placed to Meche today to assess her needs, and check in on whether she has any questions or concerns regarding her upcoming breast surgery.    Patient was recently diagnosed with right breast cancer and is scheduled to have bilateral skin sparing mastectomies with right sentinel lymph node biopsy and reconstruction by Dr. Ulloa and Dr. Peace on 1/9/23 at the [facility].    Meche has a preop physical exam with her primary provider scheduled for 1/5/22.     She is scheduled for a post op appointment on 2/2/23 @ 10:15 at [facility] [place].     I left a message asking her to give me a call with any questions, or if the post op appointment doesn't work.       Beatriz Myles, RN, BSN, PHN  Breast Care Nurse Coordinator  [facility] [place] [place]  [phone]

## 2023-01-05 ENCOUNTER — OFFICE VISIT (OUTPATIENT)
Dept: FAMILY MEDICINE | Facility: CLINIC | Age: 33
End: 2023-01-05
Payer: COMMERCIAL

## 2023-01-05 ENCOUNTER — LAB (OUTPATIENT)
Dept: LAB | Facility: CLINIC | Age: 33
End: 2023-01-05
Payer: COMMERCIAL

## 2023-01-05 VITALS
BODY MASS INDEX: 33.79 KG/M2 | SYSTOLIC BLOOD PRESSURE: 133 MMHG | TEMPERATURE: 98.1 F | HEART RATE: 85 BPM | WEIGHT: 179 LBS | DIASTOLIC BLOOD PRESSURE: 77 MMHG | HEIGHT: 61 IN | OXYGEN SATURATION: 97 %

## 2023-01-05 DIAGNOSIS — Z01.818 PREOP GENERAL PHYSICAL EXAM: Primary | ICD-10-CM

## 2023-01-05 DIAGNOSIS — D05.11 DUCTAL CARCINOMA IN SITU (DCIS) OF RIGHT BREAST: Primary | ICD-10-CM

## 2023-01-05 DIAGNOSIS — Z20.822 ENCOUNTER FOR LABORATORY TESTING FOR COVID-19 VIRUS: ICD-10-CM

## 2023-01-05 DIAGNOSIS — Z90.13 S/P MASTECTOMY, BILATERAL: ICD-10-CM

## 2023-01-05 DIAGNOSIS — C50.911 MALIGNANT NEOPLASM OF RIGHT FEMALE BREAST, UNSPECIFIED ESTROGEN RECEPTOR STATUS, UNSPECIFIED SITE OF BREAST (H): ICD-10-CM

## 2023-01-05 LAB
HGB BLD-MCNC: 15.3 G/DL (ref 11.7–15.7)
SARS-COV-2 RNA RESP QL NAA+PROBE: NEGATIVE

## 2023-01-05 PROCEDURE — 82565 ASSAY OF CREATININE: CPT | Performed by: NURSE PRACTITIONER

## 2023-01-05 PROCEDURE — U0005 INFEC AGEN DETEC AMPLI PROBE: HCPCS

## 2023-01-05 PROCEDURE — U0003 INFECTIOUS AGENT DETECTION BY NUCLEIC ACID (DNA OR RNA); SEVERE ACUTE RESPIRATORY SYNDROME CORONAVIRUS 2 (SARS-COV-2) (CORONAVIRUS DISEASE [COVID-19]), AMPLIFIED PROBE TECHNIQUE, MAKING USE OF HIGH THROUGHPUT TECHNOLOGIES AS DESCRIBED BY CMS-2020-01-R: HCPCS

## 2023-01-05 PROCEDURE — 36415 COLL VENOUS BLD VENIPUNCTURE: CPT | Performed by: NURSE PRACTITIONER

## 2023-01-05 PROCEDURE — 85018 HEMOGLOBIN: CPT | Performed by: NURSE PRACTITIONER

## 2023-01-05 PROCEDURE — 84132 ASSAY OF SERUM POTASSIUM: CPT | Performed by: NURSE PRACTITIONER

## 2023-01-05 PROCEDURE — 99214 OFFICE O/P EST MOD 30 MIN: CPT | Performed by: NURSE PRACTITIONER

## 2023-01-05 ASSESSMENT — PAIN SCALES - GENERAL: PAINLEVEL: NO PAIN (0)

## 2023-01-05 NOTE — PROGRESS NOTES
[facility]  [address] [facility] [address]  Phone: [phone]  Primary Provider: Anai - Gianna [facility]  Pre-op Performing Provider: BRANDON HUIZAR      PREOPERATIVE EVALUATION:  Today's date: 1/5/2023    Meche Morrow is a 32 year old female who presents for a preoperative evaluation.    Surgical Information:  Surgery/Procedure: bilateral skin sparing mastectomies with right sentinel lymph node biopsy  BILATERAL IMMEDIATE PREPECTORAL TISSUE EXPANDER BILATERAL BREAST RECONSTRUCTION WITH ALLODERM     POSSIBLE DIRECT TO IMPLANT         Surgery Location: [facility]  Surgeon: Sandra Peace  Surgery Date: 1/9/2023  Time of Surgery:   Where patient plans to recover: At home with family  Fax number for surgical facility: Note does not need to be faxed, will be available electronically in Epic.    Type of Anesthesia Anticipated: General    Assessment & Plan     The proposed surgical procedure is considered INTERMEDIATE risk.    Preop general physical exam  - Hemoglobin  - Creatinine  - Potassium    Malignant neoplasm of right female breast, unspecified estrogen receptor status, unspecified site of breast (H)      Risks and Recommendations:  The patient has the following additional risks and recommendations for perioperative complications:   - No identified additional risk factors other than previously addressed    Medication Instructions:  Patient is on no chronic medications  Asked her to hold vitamins starting now    RECOMMENDATION:  APPROVAL GIVEN to proceed with proposed procedure, without further diagnostic evaluation.      Subjective     HPI related to upcoming procedure: Meche Morrow is a 31 yo female who presents for a preoperative physical.  She will be having the above mentioned surgery to treat right breast cancer.  She is currently a few weeks postpartum, delivered vaginally on  12/19/2022.  Cancer identified during pregnancy after she reported right breast lump and bleeding from right nipple.      Preop Questions 1/5/2023   1. Have you ever had a heart attack or stroke? No   2. Have you ever had surgery on your heart or blood vessels, such as a stent placement, a coronary artery bypass, or surgery on an artery in your head, neck, heart, or legs? No   3. Do you have chest pain with activity? No   4. Do you have a history of  heart failure? No   5. Do you currently have a cold, bronchitis or symptoms of other infection? No   6. Do you have a cough, shortness of breath, or wheezing? No   7. Do you or anyone in your family have previous history of blood clots? No   8. Do you or does anyone in your family have a serious bleeding problem such as prolonged bleeding following surgeries or cuts? No   9. Have you ever had problems with anemia or been told to take iron pills? No   10. Have you had any abnormal blood loss such as black, tarry or bloody stools, or abnormal vaginal bleeding? No   11. Have you ever had a blood transfusion? No   12. Are you willing to have a blood transfusion if it is medically needed before, during, or after your surgery? Yes   13. Have you or any of your relatives ever had problems with anesthesia? UNKNOWN - hard time waking up after surgery for wisdom teeth     14. Do you have sleep apnea, excessive snoring or daytime drowsiness? No   15. Do you have any artifical heart valves or other implanted medical devices like a pacemaker, defibrillator, or continuous glucose monitor? No   16. Do you have artificial joints? No   17. Are you allergic to latex? No   18. Is there any chance that you may be pregnant? No       Health Care Directive:  Patient does not have a Health Care Directive or Living Will: No    Preoperative Review of :   reviewed - no record of controlled substances prescribed.        Status of Chronic Conditions:  See problem list for active medical  problems.  Problems all longstanding and stable, except as noted/documented.  See ROS for pertinent symptoms related to these conditions.      Review of Systems  CONSTITUTIONAL: NEGATIVE for fever, chills, change in weight  INTEGUMENTARY/SKIN: NEGATIVE for worrisome rashes, moles or lesions  EYES: NEGATIVE for vision changes or irritation  ENT/MOUTH: NEGATIVE for ear, mouth and throat problems  RESP: NEGATIVE for significant cough or SOB  CV: NEGATIVE for chest pain, palpitations or peripheral edema  GI: NEGATIVE for nausea, abdominal pain, heartburn, or change in bowel habits  : NEGATIVE for frequency, dysuria, or hematuria  MUSCULOSKELETAL: NEGATIVE for significant arthralgias or myalgia  NEURO: NEGATIVE for weakness, dizziness or paresthesias  ENDOCRINE: NEGATIVE for temperature intolerance, skin/hair changes  HEME: NEGATIVE for bleeding problems  PSYCHIATRIC: NEGATIVE for changes in mood or affect    Patient Active Problem List    Diagnosis Date Noted     Breast cancer (H) 2022     Priority: Medium     Supervision of normal first pregnancy, antepartum 11/10/2022     Priority: Medium     Need for Tdap vaccination 05/10/2022     Priority: Medium      Past Medical History:   Diagnosis Date     HSV (herpes simplex virus) infection      Past Surgical History:   Procedure Laterality Date     NO HISTORY OF SURGERY       Current Outpatient Medications   Medication Sig Dispense Refill     Prenatal Vit-Fe Fumarate-FA (PRENATAL MULTIVITAMIN W/IRON) 27-0.8 MG tablet Take 1 tablet by mouth daily       valACYclovir (VALTREX) 500 MG tablet Take 1 tablet (500 mg) by mouth daily 45 tablet 0       No Known Allergies     Social History     Tobacco Use     Smoking status: Former     Types: Cigarettes     Quit date: 2022     Years since quittin.9     Smokeless tobacco: Never   Substance Use Topics     Alcohol use: Not Currently       History   Drug Use Unknown         Objective     /77   Pulse 85   Temp 98.1  " F (36.7  C)   Ht 1.549 m (5' 1\")   Wt 81.2 kg (179 lb)   LMP  (LMP Unknown)   SpO2 97%   Breastfeeding No   BMI 33.82 kg/m      Physical Exam    GENERAL APPEARANCE: healthy, alert and no distress     EYES: EOMI, PERRL     HENT: nose and mouth without ulcers or lesions     NECK: no adenopathy, no asymmetry, masses, or scars and thyroid normal to palpation     RESP: lungs clear to auscultation - no rales, rhonchi or wheezes     CV: regular rates and rhythm, normal S1 S2, no S3 or S4 and no murmur, click or rub     ABDOMEN:  soft, nontender, no HSM or masses and bowel sounds normal     MS: extremities normal- no gross deformities noted, no evidence of inflammation in joints, FROM in all extremities.     SKIN: no suspicious lesions or rashes     NEURO: Normal strength and tone, sensory exam grossly normal, mentation intact and speech normal     PSYCH: mentation appears normal. and affect normal/bright     LYMPHATICS: No cervical adenopathy    Recent Labs   Lab Test 09/20/22  1543 06/03/22  1451   HGB 12.7 13.0   PLT  --  300        Diagnostics:  Labs pending at this time.  Results will be reviewed when available.   No EKG required, no history of coronary heart disease, significant arrhythmia, peripheral arterial disease or other structural heart disease.    Revised Cardiac Risk Index (RCRI):  The patient has the following serious cardiovascular risks for perioperative complications:   - No serious cardiac risks = 0 points     RCRI Interpretation: 0 points: Class I (very low risk - 0.4% complication rate)           Signed Electronically by: Pennie Pleitez CNP  Copy of this evaluation report is provided to requesting physician.      "

## 2023-01-05 NOTE — NURSING NOTE
RN received call that Meche was currently at the [facility] for a post mastectomy garment. She was calling for a prescription. Order placed and will fax to [phone] once it has been signed by Dr. Ulloa.     Beatriz Myles 1/5/2023 12:43 PM      Beatriz Myles RN, BSN, PHN  Breast Care Nurse Coordinator  [facility] [place] [place]  [phone]

## 2023-01-05 NOTE — PATIENT INSTRUCTIONS
Hold vitamin starting now.       For informational purposes only. Not to replace the advice of your health care provider. Copyright   ,  [facility]. All rights reserved. Clinically reviewed by Dorene Guevara MD. [facility] [id number] - REV .  Preparing for Your Surgery  Getting started  A nurse will call you to review your health history and instructions. They will give you an arrival time based on your scheduled surgery time. Please be ready to share:  Your doctor's clinic name and phone number  Your medical, surgical, and anesthesia history  A list of allergies and sensitivities  A list of medicines, including herbal treatments and over-the-counter drugs  Whether the patient has a legal guardian (ask how to send us the papers in advance)  Please tell us if you're pregnant--or if there's any chance you might be pregnant. Some surgeries may injure a fetus (unborn baby), so they require a pregnancy test. Surgeries that are safe for a fetus don't always need a test, and you can choose whether to have one.   If you have a child who's having surgery, please ask for a copy of Preparing for Your Child's Surgery.    Preparing for surgery  Within 10 to 30 days of surgery: Have a pre-op exam (sometimes called an H&P, or History and Physical). This can be done at a clinic or pre-operative center.  If you're having a , you may not need this exam. Talk to your care team.  At your pre-op exam, talk to your care team about all medicines you take. If you need to stop any medicines before surgery, ask when to start taking them again.  We do this for your safety. Many medicines can make you bleed too much during surgery. Some change how well surgery (anesthesia) drugs work.  Call your insurance company to let them know you're having surgery. (If you don't have insurance, call [phone].)  Call your clinic if there's any change in your health. This includes signs of a cold or flu (sore throat, runny  nose, cough, rash, fever). It also includes a scrape or scratch near the surgery site.  If you have questions on the day of surgery, call your hospital or surgery center.  Eating and drinking guidelines  For your safety: Unless your surgeon tells you otherwise, follow the guidelines below.  Eat and drink as usual until 8 hours before you arrive for surgery. After that, no food or milk.  Drink clear liquids until 2 hours before you arrive. These are liquids you can see through, like water, Gatorade, and Propel Water. They also include plain black coffee and tea (no cream or milk), candy, and breath mints. You can spit out gum when you arrive.  If you drink alcohol: Stop drinking it the night before surgery.  If your care team tells you to take medicine on the morning of surgery, it's okay to take it with a sip of water.  Preventing infection  Shower or bathe the night before and morning of your surgery. Follow the instructions your clinic gave you. (If no instructions, use regular soap.)  Don't shave or clip hair near your surgery site. We'll remove the hair if needed.  Don't smoke or vape the morning of surgery. You may chew nicotine gum up to 2 hours before surgery. A nicotine patch is okay.  Note: Some surgeries require you to completely quit smoking and nicotine. Check with your surgeon.  Your care team will make every effort to keep you safe from infection. We will:  Clean our hands often with soap and water (or an alcohol-based hand rub).  Clean the skin at your surgery site with a special soap that kills germs.  Give you a special gown to keep you warm. (Cold raises the risk of infection.)  Wear special hair covers, masks, gowns and gloves during surgery.  Give antibiotic medicine, if prescribed. Not all surgeries need antibiotics.  What to bring on the day of surgery  Photo ID and insurance card  Copy of your health care directive, if you have one  Glasses and hearing aids (bring cases)  You can't wear  contacts during surgery  Inhaler and eye drops, if you use them (tell us about these when you arrive)  CPAP machine or breathing device, if you use them  A few personal items, if spending the night  If you have . . .  A pacemaker, ICD (cardiac defibrillator) or other implant: Bring the ID card.  An implanted stimulator: Bring the remote control.  A legal guardian: Bring a copy of the certified (court-stamped) guardianship papers.  Please remove any jewelry, including body piercings. Leave jewelry and other valuables at home.  If you're going home the day of surgery  You must have a responsible adult drive you home. They should stay with you overnight as well.  If you don't have someone to stay with you, and you aren't safe to go home alone, we may keep you overnight. Insurance often won't pay for this.  After surgery  If it's hard to control your pain or you need more pain medicine, please call your surgeon's office.  Questions?   If you have any questions for your care team, list them here: _________________________________________________________________________________________________________________________________________________________________________ ____________________________________ ____________________________________ ____________________________________

## 2023-01-05 NOTE — H&P (VIEW-ONLY)
[facility]  [address] [place] [address]  Phone: [phone]  Primary Provider: [facility]  Pre-op Performing Provider: BRANDON HUIZAR      PREOPERATIVE EVALUATION:  Today's date: 1/5/2023    Meche Morrow is a 32 year old female who presents for a preoperative evaluation.    Surgical Information:  Surgery/Procedure: bilateral skin sparing mastectomies with right sentinel lymph node biopsy  BILATERAL IMMEDIATE PREPECTORAL TISSUE EXPANDER BILATERAL BREAST RECONSTRUCTION WITH ALLODERM     POSSIBLE DIRECT TO IMPLANT         Surgery Location: [facility]  Surgeon: Sandra Peace  Surgery Date: 1/9/2023  Time of Surgery:   Where patient plans to recover: At home with family  Fax number for surgical facility: Note does not need to be faxed, will be available electronically in Epic.    Type of Anesthesia Anticipated: General    Assessment & Plan     The proposed surgical procedure is considered INTERMEDIATE risk.    Preop general physical exam  - Hemoglobin  - Creatinine  - Potassium    Malignant neoplasm of right female breast, unspecified estrogen receptor status, unspecified site of breast (H)      Risks and Recommendations:  The patient has the following additional risks and recommendations for perioperative complications:   - No identified additional risk factors other than previously addressed    Medication Instructions:  Patient is on no chronic medications  Asked her to hold vitamins starting now    RECOMMENDATION:  APPROVAL GIVEN to proceed with proposed procedure, without further diagnostic evaluation.      Subjective     HPI related to upcoming procedure: Meche Morrow is a 33 yo female who presents for a preoperative physical.  She will be having the above mentioned surgery to treat right breast cancer.  She is currently a few weeks postpartum, delivered vaginally on  12/19/2022.  Cancer identified during pregnancy after she reported right breast lump and bleeding from right nipple.      Preop Questions 1/5/2023   1. Have you ever had a heart attack or stroke? No   2. Have you ever had surgery on your heart or blood vessels, such as a stent placement, a coronary artery bypass, or surgery on an artery in your head, neck, heart, or legs? No   3. Do you have chest pain with activity? No   4. Do you have a history of  heart failure? No   5. Do you currently have a cold, bronchitis or symptoms of other infection? No   6. Do you have a cough, shortness of breath, or wheezing? No   7. Do you or anyone in your family have previous history of blood clots? No   8. Do you or does anyone in your family have a serious bleeding problem such as prolonged bleeding following surgeries or cuts? No   9. Have you ever had problems with anemia or been told to take iron pills? No   10. Have you had any abnormal blood loss such as black, tarry or bloody stools, or abnormal vaginal bleeding? No   11. Have you ever had a blood transfusion? No   12. Are you willing to have a blood transfusion if it is medically needed before, during, or after your surgery? Yes   13. Have you or any of your relatives ever had problems with anesthesia? UNKNOWN - hard time waking up after surgery for wisdom teeth     14. Do you have sleep apnea, excessive snoring or daytime drowsiness? No   15. Do you have any artifical heart valves or other implanted medical devices like a pacemaker, defibrillator, or continuous glucose monitor? No   16. Do you have artificial joints? No   17. Are you allergic to latex? No   18. Is there any chance that you may be pregnant? No       Health Care Directive:  Patient does not have a Health Care Directive or Living Will: No    Preoperative Review of :   reviewed - no record of controlled substances prescribed.        Status of Chronic Conditions:  See problem list for active medical  problems.  Problems all longstanding and stable, except as noted/documented.  See ROS for pertinent symptoms related to these conditions.      Review of Systems  CONSTITUTIONAL: NEGATIVE for fever, chills, change in weight  INTEGUMENTARY/SKIN: NEGATIVE for worrisome rashes, moles or lesions  EYES: NEGATIVE for vision changes or irritation  ENT/MOUTH: NEGATIVE for ear, mouth and throat problems  RESP: NEGATIVE for significant cough or SOB  CV: NEGATIVE for chest pain, palpitations or peripheral edema  GI: NEGATIVE for nausea, abdominal pain, heartburn, or change in bowel habits  : NEGATIVE for frequency, dysuria, or hematuria  MUSCULOSKELETAL: NEGATIVE for significant arthralgias or myalgia  NEURO: NEGATIVE for weakness, dizziness or paresthesias  ENDOCRINE: NEGATIVE for temperature intolerance, skin/hair changes  HEME: NEGATIVE for bleeding problems  PSYCHIATRIC: NEGATIVE for changes in mood or affect    Patient Active Problem List    Diagnosis Date Noted     Breast cancer (H) 2022     Priority: Medium     Supervision of normal first pregnancy, antepartum 11/10/2022     Priority: Medium     Need for Tdap vaccination 05/10/2022     Priority: Medium      Past Medical History:   Diagnosis Date     HSV (herpes simplex virus) infection      Past Surgical History:   Procedure Laterality Date     NO HISTORY OF SURGERY       Current Outpatient Medications   Medication Sig Dispense Refill     Prenatal Vit-Fe Fumarate-FA (PRENATAL MULTIVITAMIN W/IRON) 27-0.8 MG tablet Take 1 tablet by mouth daily       valACYclovir (VALTREX) 500 MG tablet Take 1 tablet (500 mg) by mouth daily 45 tablet 0       No Known Allergies     Social History     Tobacco Use     Smoking status: Former     Types: Cigarettes     Quit date: 2022     Years since quittin.9     Smokeless tobacco: Never   Substance Use Topics     Alcohol use: Not Currently       History   Drug Use Unknown         Objective     /77   Pulse 85   Temp 98.1  " F (36.7  C)   Ht 1.549 m (5' 1\")   Wt 81.2 kg (179 lb)   LMP  (LMP Unknown)   SpO2 97%   Breastfeeding No   BMI 33.82 kg/m      Physical Exam    GENERAL APPEARANCE: healthy, alert and no distress     EYES: EOMI, PERRL     HENT: nose and mouth without ulcers or lesions     NECK: no adenopathy, no asymmetry, masses, or scars and thyroid normal to palpation     RESP: lungs clear to auscultation - no rales, rhonchi or wheezes     CV: regular rates and rhythm, normal S1 S2, no S3 or S4 and no murmur, click or rub     ABDOMEN:  soft, nontender, no HSM or masses and bowel sounds normal     MS: extremities normal- no gross deformities noted, no evidence of inflammation in joints, FROM in all extremities.     SKIN: no suspicious lesions or rashes     NEURO: Normal strength and tone, sensory exam grossly normal, mentation intact and speech normal     PSYCH: mentation appears normal. and affect normal/bright     LYMPHATICS: No cervical adenopathy    Recent Labs   Lab Test 09/20/22  1543 06/03/22  1451   HGB 12.7 13.0   PLT  --  300        Diagnostics:  Labs pending at this time.  Results will be reviewed when available.   No EKG required, no history of coronary heart disease, significant arrhythmia, peripheral arterial disease or other structural heart disease.    Revised Cardiac Risk Index (RCRI):  The patient has the following serious cardiovascular risks for perioperative complications:   - No serious cardiac risks = 0 points     RCRI Interpretation: 0 points: Class I (very low risk - 0.4% complication rate)           Signed Electronically by: Pennie Pleitez CNP  Copy of this evaluation report is provided to requesting physician.      "

## 2023-01-06 LAB
CREAT SERPL-MCNC: 0.72 MG/DL (ref 0.52–1.04)
GFR SERPL CREATININE-BSD FRML MDRD: >90 ML/MIN/1.73M2
POTASSIUM BLD-SCNC: 4 MMOL/L (ref 3.4–5.3)

## 2023-01-08 ENCOUNTER — ANESTHESIA EVENT (OUTPATIENT)
Dept: SURGERY | Facility: CLINIC | Age: 33
End: 2023-01-08
Payer: COMMERCIAL

## 2023-01-08 NOTE — ANESTHESIA PREPROCEDURE EVALUATION
Anesthesia Pre-Procedure Evaluation    Patient: Meche Morrow   MRN: [id number] : 1990        Procedure : Procedure(s):  bilateral skin sparing mastectomies with right sentinel lymph node biopsy  BILATERAL IMMEDIATE PREPECTORAL TISSUE EXPANDER BILATERAL BREAST RECONSTRUCTION WITH ALLODERM  POSSIBLE DIRECT TO IMPLANT          Past Medical History:   Diagnosis Date     HSV (herpes simplex virus) infection       Past Surgical History:   Procedure Laterality Date     wisdom teeth extraction        No Known Allergies   Social History     Tobacco Use     Smoking status: Former     Types: Cigarettes     Quit date: 2022     Years since quittin.9     Smokeless tobacco: Never   Substance Use Topics     Alcohol use: Not Currently      Wt Readings from Last 1 Encounters:   23 81.2 kg (179 lb)      HGB 15.3  K 4.0  Anesthesia Evaluation   Pt has had prior anesthetic.     No history of anesthetic complications (slow to wake up after wisdom teeth)       ROS/MED HX  ENT/Pulmonary:     (+) asthma Treatment: Inhaler prn,   (-) sleep apnea   Neurologic:     (+) no peripheral neuropathy  (-) no seizures, no Multiple Sclerosis and migraines   Cardiovascular: Comment: Elevated blood pressures during pregnancy      METS/Exercise Tolerance:     Hematologic:  - neg hematologic  ROS     Musculoskeletal:  - neg musculoskeletal ROS     GI/Hepatic: Comment: GERD during pregnancy, resolved      Renal/Genitourinary:  - neg Renal ROS     Endo:  - neg endo ROS  (-) Type II DM and thyroid disease   Psychiatric/Substance Use:  - neg psychiatric ROS     Infectious Disease:  - neg infectious disease ROS     Malignancy:   (+) Malignancy, History of Breast.    Other: Comment: S/p vaginal delivery 2022           Physical Exam    Airway  airway exam normal      Mallampati: II   TM distance: > 3 FB   Neck ROM: full   Mouth opening: > 3 cm    Respiratory Devices and Support         Dental  no notable dental history          Cardiovascular   cardiovascular exam normal       Rhythm and rate: regular and normal     Pulmonary   pulmonary exam normal        breath sounds clear to auscultation           OUTSIDE LABS:  CBC:   Lab Results   Component Value Date    WBC 9.5 06/03/2022    HGB 15.3 01/05/2023    HGB 12.7 09/20/2022    HCT 37.3 06/03/2022     06/03/2022     BMP:   Lab Results   Component Value Date    POTASSIUM 4.0 01/05/2023    CR 0.72 01/05/2023     COAGS: No results found for: PTT, INR, FIBR  POC: No results found for: BGM, HCG, HCGS  HEPATIC: No results found for: ALBUMIN, PROTTOTAL, ALT, AST, GGT, ALKPHOS, BILITOTAL, BILIDIRECT, ZAINA  OTHER: No results found for: PH, LACT, A1C, PHILLIP, PHOS, MAG, LIPASE, AMYLASE, TSH, T4, T3, CRP, SED    Anesthesia Plan    ASA Status:  2   NPO Status:  NPO Appropriate    Anesthesia Type: General.     - Airway: ETT   Induction: Propofol.   Maintenance: TIVA.        Consents    Anesthesia Plan(s) and associated risks, benefits, and realistic alternatives discussed. Questions answered and patient/representative(s) expressed understanding.    - Discussed:     - Discussed with:  Patient         Postoperative Care    Pain management: Peripheral nerve block (Single Shot), Multi-modal analgesia.        Comments:    Other Comments: The surgeon has given a verbal order transferring care of this patient to me for the performance of a regional analgesia block for post-op pain control. It is requested of me because I am uniquely trained and qualified to perform this block and the surgeon is neither trained nor qualified to perform this procedure.            Wayne Christianson MD

## 2023-01-09 ENCOUNTER — HOSPITAL ENCOUNTER (OUTPATIENT)
Dept: NUCLEAR MEDICINE | Facility: CLINIC | Age: 33
Setting detail: NUCLEAR MEDICINE
Discharge: HOME | End: 2023-01-09
Attending: SURGERY | Admitting: SURGERY
Payer: COMMERCIAL

## 2023-01-09 ENCOUNTER — ANESTHESIA (OUTPATIENT)
Dept: SURGERY | Facility: CLINIC | Age: 33
End: 2023-01-09
Payer: COMMERCIAL

## 2023-01-09 ENCOUNTER — HOSPITAL ENCOUNTER (OUTPATIENT)
Facility: CLINIC | Age: 33
Discharge: HOME | End: 2023-01-10
Attending: SURGERY | Admitting: SURGERY
Payer: COMMERCIAL

## 2023-01-09 ENCOUNTER — APPOINTMENT (OUTPATIENT)
Dept: SURGERY | Facility: PHYSICIAN GROUP | Age: 33
End: 2023-01-09
Payer: COMMERCIAL

## 2023-01-09 DIAGNOSIS — D05.11 DUCTAL CARCINOMA IN SITU (DCIS) OF RIGHT BREAST: ICD-10-CM

## 2023-01-09 DIAGNOSIS — Z98.890 STATUS POST BILATERAL BREAST RECONSTRUCTION: Primary | ICD-10-CM

## 2023-01-09 LAB — HCG UR QL: NEGATIVE

## 2023-01-09 PROCEDURE — L8600 IMPLANT BREAST SILICONE/EQ: HCPCS | Performed by: SURGERY

## 2023-01-09 PROCEDURE — 19303 MAST SIMPLE COMPLETE: CPT | Mod: 50 | Performed by: PHYSICIAN ASSISTANT

## 2023-01-09 PROCEDURE — 88307 TISSUE EXAM BY PATHOLOGIST: CPT | Mod: TC | Performed by: SURGERY

## 2023-01-09 PROCEDURE — 81025 URINE PREGNANCY TEST: CPT | Performed by: ANESTHESIOLOGY

## 2023-01-09 PROCEDURE — 250N000011 HC RX IP 250 OP 636: Performed by: PHYSICIAN ASSISTANT

## 2023-01-09 PROCEDURE — 250N000009 HC RX 250: Performed by: NURSE ANESTHETIST, CERTIFIED REGISTERED

## 2023-01-09 PROCEDURE — 38525 BIOPSY/REMOVAL LYMPH NODES: CPT | Mod: RT | Performed by: SURGERY

## 2023-01-09 PROCEDURE — 999N000141 HC STATISTIC PRE-PROCEDURE NURSING ASSESSMENT: Performed by: SURGERY

## 2023-01-09 PROCEDURE — 258N000003 HC RX IP 258 OP 636: Performed by: PHYSICIAN ASSISTANT

## 2023-01-09 PROCEDURE — 250N000011 HC RX IP 250 OP 636: Performed by: NURSE ANESTHETIST, CERTIFIED REGISTERED

## 2023-01-09 PROCEDURE — 38792 RA TRACER ID OF SENTINL NODE: CPT

## 2023-01-09 PROCEDURE — 343N000001 HC RX 343: Performed by: SURGERY

## 2023-01-09 PROCEDURE — 250N000009 HC RX 250: Performed by: SURGERY

## 2023-01-09 PROCEDURE — 710N000009 HC RECOVERY PHASE 1, LEVEL 1, PER MIN: Performed by: SURGERY

## 2023-01-09 PROCEDURE — 250N000025 HC SEVOFLURANE, PER MIN: Performed by: SURGERY

## 2023-01-09 PROCEDURE — 250N000009 HC RX 250: Performed by: ANESTHESIOLOGY

## 2023-01-09 PROCEDURE — 360N000076 HC SURGERY LEVEL 3, PER MIN: Performed by: SURGERY

## 2023-01-09 PROCEDURE — 19303 MAST SIMPLE COMPLETE: CPT | Mod: 50 | Performed by: SURGERY

## 2023-01-09 PROCEDURE — 250N000013 HC RX MED GY IP 250 OP 250 PS 637: Performed by: PHYSICIAN ASSISTANT

## 2023-01-09 PROCEDURE — 258N000003 HC RX IP 258 OP 636: Performed by: ANESTHESIOLOGY

## 2023-01-09 PROCEDURE — 250N000011 HC RX IP 250 OP 636: Performed by: ANESTHESIOLOGY

## 2023-01-09 PROCEDURE — 370N000017 HC ANESTHESIA TECHNICAL FEE, PER MIN: Performed by: SURGERY

## 2023-01-09 PROCEDURE — 272N000001 HC OR GENERAL SUPPLY STERILE: Performed by: SURGERY

## 2023-01-09 PROCEDURE — A9520 TC99 TILMANOCEPT DIAG 0.5MCI: HCPCS | Performed by: SURGERY

## 2023-01-09 DEVICE — GRAFT ALLODERM 16X20CM  1518320P CHARGE PER SQ CM= 320 UNITS: Type: IMPLANTABLE DEVICE | Site: BREAST | Status: FUNCTIONAL

## 2023-01-09 DEVICE — IMPLANTABLE DEVICE: Type: IMPLANTABLE DEVICE | Site: BREAST | Status: FUNCTIONAL

## 2023-01-09 RX ORDER — OXYCODONE HYDROCHLORIDE 5 MG/1
10 TABLET ORAL EVERY 4 HOURS PRN
Status: DISCONTINUED | OUTPATIENT
Start: 2023-01-09 | End: 2023-01-10 | Stop reason: HOSPADM

## 2023-01-09 RX ORDER — DEXMEDETOMIDINE HYDROCHLORIDE 4 UG/ML
INJECTION, SOLUTION INTRAVENOUS PRN
Status: DISCONTINUED | OUTPATIENT
Start: 2023-01-09 | End: 2023-01-09

## 2023-01-09 RX ORDER — ALBUTEROL SULFATE 90 UG/1
2 AEROSOL, METERED RESPIRATORY (INHALATION) EVERY 6 HOURS PRN
Status: DISCONTINUED | OUTPATIENT
Start: 2023-01-09 | End: 2023-01-10 | Stop reason: HOSPADM

## 2023-01-09 RX ORDER — NALOXONE HYDROCHLORIDE 0.4 MG/ML
0.2 INJECTION, SOLUTION INTRAMUSCULAR; INTRAVENOUS; SUBCUTANEOUS
Status: DISCONTINUED | OUTPATIENT
Start: 2023-01-09 | End: 2023-01-10 | Stop reason: HOSPADM

## 2023-01-09 RX ORDER — ONDANSETRON 4 MG/1
4 TABLET, ORALLY DISINTEGRATING ORAL EVERY 6 HOURS PRN
Status: DISCONTINUED | OUTPATIENT
Start: 2023-01-09 | End: 2023-01-10 | Stop reason: HOSPADM

## 2023-01-09 RX ORDER — ALBUTEROL SULFATE 90 UG/1
2 AEROSOL, METERED RESPIRATORY (INHALATION) EVERY 6 HOURS PRN
COMMUNITY
End: 2023-10-13

## 2023-01-09 RX ORDER — DEXAMETHASONE SODIUM PHOSPHATE 4 MG/ML
INJECTION, SOLUTION INTRA-ARTICULAR; INTRALESIONAL; INTRAMUSCULAR; INTRAVENOUS; SOFT TISSUE PRN
Status: DISCONTINUED | OUTPATIENT
Start: 2023-01-09 | End: 2023-01-09

## 2023-01-09 RX ORDER — BUPIVACAINE HYDROCHLORIDE AND EPINEPHRINE 2.5; 5 MG/ML; UG/ML
INJECTION, SOLUTION INFILTRATION; PERINEURAL
Status: COMPLETED | OUTPATIENT
Start: 2023-01-09 | End: 2023-01-09

## 2023-01-09 RX ORDER — MAGNESIUM HYDROXIDE 1200 MG/15ML
LIQUID ORAL PRN
Status: DISCONTINUED | OUTPATIENT
Start: 2023-01-09 | End: 2023-01-09 | Stop reason: HOSPADM

## 2023-01-09 RX ORDER — CEFAZOLIN SODIUM/WATER 2 G/20 ML
2 SYRINGE (ML) INTRAVENOUS SEE ADMIN INSTRUCTIONS
Status: DISCONTINUED | OUTPATIENT
Start: 2023-01-09 | End: 2023-01-09 | Stop reason: HOSPADM

## 2023-01-09 RX ORDER — ACETAMINOPHEN 500 MG
500-1000 TABLET ORAL EVERY 6 HOURS PRN
COMMUNITY
End: 2023-08-17

## 2023-01-09 RX ORDER — CEFAZOLIN SODIUM/WATER 2 G/20 ML
2 SYRINGE (ML) INTRAVENOUS
Status: COMPLETED | OUTPATIENT
Start: 2023-01-09 | End: 2023-01-09

## 2023-01-09 RX ORDER — OXYCODONE HYDROCHLORIDE 5 MG/1
5 TABLET ORAL EVERY 4 HOURS PRN
Status: DISCONTINUED | OUTPATIENT
Start: 2023-01-09 | End: 2023-01-10 | Stop reason: HOSPADM

## 2023-01-09 RX ORDER — GABAPENTIN 600 MG/1
600 TABLET ORAL ONCE
Status: COMPLETED | OUTPATIENT
Start: 2023-01-09 | End: 2023-01-09

## 2023-01-09 RX ORDER — ONDANSETRON 2 MG/ML
4 INJECTION INTRAMUSCULAR; INTRAVENOUS EVERY 6 HOURS PRN
Status: DISCONTINUED | OUTPATIENT
Start: 2023-01-09 | End: 2023-01-10 | Stop reason: HOSPADM

## 2023-01-09 RX ORDER — GLYCOPYRROLATE 0.2 MG/ML
INJECTION, SOLUTION INTRAMUSCULAR; INTRAVENOUS PRN
Status: DISCONTINUED | OUTPATIENT
Start: 2023-01-09 | End: 2023-01-09

## 2023-01-09 RX ORDER — NALOXONE HYDROCHLORIDE 0.4 MG/ML
0.4 INJECTION, SOLUTION INTRAMUSCULAR; INTRAVENOUS; SUBCUTANEOUS
Status: DISCONTINUED | OUTPATIENT
Start: 2023-01-09 | End: 2023-01-10 | Stop reason: HOSPADM

## 2023-01-09 RX ORDER — LIDOCAINE 40 MG/G
CREAM TOPICAL
Status: DISCONTINUED | OUTPATIENT
Start: 2023-01-09 | End: 2023-01-10 | Stop reason: HOSPADM

## 2023-01-09 RX ORDER — SODIUM CHLORIDE 9 MG/ML
INJECTION, SOLUTION INTRAVENOUS CONTINUOUS
Status: DISCONTINUED | OUTPATIENT
Start: 2023-01-09 | End: 2023-01-10

## 2023-01-09 RX ORDER — ONDANSETRON 4 MG/1
4 TABLET, ORALLY DISINTEGRATING ORAL EVERY 30 MIN PRN
Status: DISCONTINUED | OUTPATIENT
Start: 2023-01-09 | End: 2023-01-09 | Stop reason: HOSPADM

## 2023-01-09 RX ORDER — FENTANYL CITRATE 50 UG/ML
INJECTION, SOLUTION INTRAMUSCULAR; INTRAVENOUS PRN
Status: DISCONTINUED | OUTPATIENT
Start: 2023-01-09 | End: 2023-01-09

## 2023-01-09 RX ORDER — SODIUM CHLORIDE, SODIUM LACTATE, POTASSIUM CHLORIDE, CALCIUM CHLORIDE 600; 310; 30; 20 MG/100ML; MG/100ML; MG/100ML; MG/100ML
INJECTION, SOLUTION INTRAVENOUS CONTINUOUS
Status: DISCONTINUED | OUTPATIENT
Start: 2023-01-09 | End: 2023-01-09 | Stop reason: HOSPADM

## 2023-01-09 RX ORDER — ACETAMINOPHEN 325 MG/1
650 TABLET ORAL EVERY 6 HOURS PRN
Status: DISCONTINUED | OUTPATIENT
Start: 2023-01-09 | End: 2023-01-10

## 2023-01-09 RX ORDER — HYDROMORPHONE HCL IN WATER/PF 6 MG/30 ML
0.2 PATIENT CONTROLLED ANALGESIA SYRINGE INTRAVENOUS EVERY 5 MIN PRN
Status: DISCONTINUED | OUTPATIENT
Start: 2023-01-09 | End: 2023-01-09 | Stop reason: HOSPADM

## 2023-01-09 RX ORDER — ONDANSETRON 2 MG/ML
INJECTION INTRAMUSCULAR; INTRAVENOUS PRN
Status: DISCONTINUED | OUTPATIENT
Start: 2023-01-09 | End: 2023-01-09

## 2023-01-09 RX ORDER — PROPOFOL 10 MG/ML
INJECTION, EMULSION INTRAVENOUS PRN
Status: DISCONTINUED | OUTPATIENT
Start: 2023-01-09 | End: 2023-01-09

## 2023-01-09 RX ORDER — FENTANYL CITRATE 0.05 MG/ML
25 INJECTION, SOLUTION INTRAMUSCULAR; INTRAVENOUS EVERY 5 MIN PRN
Status: DISCONTINUED | OUTPATIENT
Start: 2023-01-09 | End: 2023-01-09 | Stop reason: HOSPADM

## 2023-01-09 RX ORDER — ONDANSETRON 2 MG/ML
4 INJECTION INTRAMUSCULAR; INTRAVENOUS EVERY 30 MIN PRN
Status: DISCONTINUED | OUTPATIENT
Start: 2023-01-09 | End: 2023-01-09 | Stop reason: HOSPADM

## 2023-01-09 RX ORDER — NEOSTIGMINE METHYLSULFATE 1 MG/ML
VIAL (ML) INJECTION PRN
Status: DISCONTINUED | OUTPATIENT
Start: 2023-01-09 | End: 2023-01-09

## 2023-01-09 RX ORDER — HYDROMORPHONE HCL IN WATER/PF 6 MG/30 ML
0.4 PATIENT CONTROLLED ANALGESIA SYRINGE INTRAVENOUS
Status: DISCONTINUED | OUTPATIENT
Start: 2023-01-09 | End: 2023-01-10 | Stop reason: HOSPADM

## 2023-01-09 RX ORDER — PROPOFOL 10 MG/ML
INJECTION, EMULSION INTRAVENOUS CONTINUOUS PRN
Status: DISCONTINUED | OUTPATIENT
Start: 2023-01-09 | End: 2023-01-09

## 2023-01-09 RX ORDER — SULFAMETHOXAZOLE/TRIMETHOPRIM 800-160 MG
1 TABLET ORAL 2 TIMES DAILY
Status: DISCONTINUED | OUTPATIENT
Start: 2023-01-09 | End: 2023-01-10 | Stop reason: HOSPADM

## 2023-01-09 RX ORDER — LIDOCAINE HYDROCHLORIDE 20 MG/ML
INJECTION, SOLUTION INFILTRATION; PERINEURAL PRN
Status: DISCONTINUED | OUTPATIENT
Start: 2023-01-09 | End: 2023-01-09

## 2023-01-09 RX ORDER — CEFAZOLIN SODIUM/WATER 2 G/20 ML
2 SYRINGE (ML) INTRAVENOUS
Status: DISCONTINUED | OUTPATIENT
Start: 2023-01-09 | End: 2023-01-09 | Stop reason: HOSPADM

## 2023-01-09 RX ORDER — INDOCYANINE GREEN AND WATER 25 MG
KIT INJECTION PRN
Status: DISCONTINUED | OUTPATIENT
Start: 2023-01-09 | End: 2023-01-09

## 2023-01-09 RX ORDER — ACETAMINOPHEN 500 MG
1000 TABLET ORAL ONCE
Status: COMPLETED | OUTPATIENT
Start: 2023-01-09 | End: 2023-01-09

## 2023-01-09 RX ORDER — FENTANYL CITRATE 0.05 MG/ML
50 INJECTION, SOLUTION INTRAMUSCULAR; INTRAVENOUS EVERY 5 MIN PRN
Status: DISCONTINUED | OUTPATIENT
Start: 2023-01-09 | End: 2023-01-09 | Stop reason: HOSPADM

## 2023-01-09 RX ORDER — HYDROMORPHONE HCL IN WATER/PF 6 MG/30 ML
0.2 PATIENT CONTROLLED ANALGESIA SYRINGE INTRAVENOUS
Status: DISCONTINUED | OUTPATIENT
Start: 2023-01-09 | End: 2023-01-10 | Stop reason: HOSPADM

## 2023-01-09 RX ORDER — HYDROXYZINE HYDROCHLORIDE 25 MG/1
25 TABLET, FILM COATED ORAL EVERY 6 HOURS PRN
Status: DISCONTINUED | OUTPATIENT
Start: 2023-01-09 | End: 2023-01-10 | Stop reason: HOSPADM

## 2023-01-09 RX ORDER — HYDROMORPHONE HCL IN WATER/PF 6 MG/30 ML
0.4 PATIENT CONTROLLED ANALGESIA SYRINGE INTRAVENOUS EVERY 5 MIN PRN
Status: DISCONTINUED | OUTPATIENT
Start: 2023-01-09 | End: 2023-01-09 | Stop reason: HOSPADM

## 2023-01-09 RX ADMIN — FENTANYL CITRATE 25 MCG: 50 INJECTION, SOLUTION INTRAMUSCULAR; INTRAVENOUS at 12:56

## 2023-01-09 RX ADMIN — SODIUM CHLORIDE, POTASSIUM CHLORIDE, SODIUM LACTATE AND CALCIUM CHLORIDE: 600; 310; 30; 20 INJECTION, SOLUTION INTRAVENOUS at 07:42

## 2023-01-09 RX ADMIN — OXYCODONE HYDROCHLORIDE 5 MG: 5 TABLET ORAL at 15:16

## 2023-01-09 RX ADMIN — BUPIVACAINE HYDROCHLORIDE 30 ML: 2.5 INJECTION, SOLUTION EPIDURAL; INFILTRATION; INTRACAUDAL at 07:58

## 2023-01-09 RX ADMIN — MIDAZOLAM 2 MG: 1 INJECTION INTRAMUSCULAR; INTRAVENOUS at 07:42

## 2023-01-09 RX ADMIN — ROCURONIUM BROMIDE 40 MG: 50 INJECTION, SOLUTION INTRAVENOUS at 07:50

## 2023-01-09 RX ADMIN — ROCURONIUM BROMIDE 20 MG: 50 INJECTION, SOLUTION INTRAVENOUS at 10:15

## 2023-01-09 RX ADMIN — DEXAMETHASONE SODIUM PHOSPHATE 4 MG: 4 INJECTION, SOLUTION INTRA-ARTICULAR; INTRALESIONAL; INTRAMUSCULAR; INTRAVENOUS; SOFT TISSUE at 08:12

## 2023-01-09 RX ADMIN — INDOCYANINE GREEN AND WATER 12.5 MG: KIT at 10:27

## 2023-01-09 RX ADMIN — SODIUM CHLORIDE, POTASSIUM CHLORIDE, SODIUM LACTATE AND CALCIUM CHLORIDE: 600; 310; 30; 20 INJECTION, SOLUTION INTRAVENOUS at 09:32

## 2023-01-09 RX ADMIN — FENTANYL CITRATE 25 MCG: 50 INJECTION, SOLUTION INTRAMUSCULAR; INTRAVENOUS at 12:34

## 2023-01-09 RX ADMIN — PROPOFOL 150 MCG/KG/MIN: 10 INJECTION, EMULSION INTRAVENOUS at 07:52

## 2023-01-09 RX ADMIN — ROCURONIUM BROMIDE 10 MG: 50 INJECTION, SOLUTION INTRAVENOUS at 08:19

## 2023-01-09 RX ADMIN — DEXMEDETOMIDINE HYDROCHLORIDE 8 MCG: 200 INJECTION INTRAVENOUS at 08:31

## 2023-01-09 RX ADMIN — Medication 2 G: at 07:45

## 2023-01-09 RX ADMIN — Medication 2 G: at 11:45

## 2023-01-09 RX ADMIN — SULFAMETHOXAZOLE AND TRIMETHOPRIM 1 TABLET: 800; 160 TABLET ORAL at 21:06

## 2023-01-09 RX ADMIN — HYDROMORPHONE HYDROCHLORIDE 0.2 MG: 0.2 INJECTION, SOLUTION INTRAMUSCULAR; INTRAVENOUS; SUBCUTANEOUS at 13:12

## 2023-01-09 RX ADMIN — TILMANOCEPT 0.51 MILLICURIE: KIT at 07:20

## 2023-01-09 RX ADMIN — HYDROMORPHONE HYDROCHLORIDE 0.2 MG: 0.2 INJECTION, SOLUTION INTRAMUSCULAR; INTRAVENOUS; SUBCUTANEOUS at 13:35

## 2023-01-09 RX ADMIN — NEOSTIGMINE METHYLSULFATE 4 MG: 1 INJECTION, SOLUTION INTRAVENOUS at 11:49

## 2023-01-09 RX ADMIN — ACETAMINOPHEN 1000 MG: 500 TABLET ORAL at 06:59

## 2023-01-09 RX ADMIN — BUPIVACAINE HYDROCHLORIDE AND EPINEPHRINE BITARTRATE 30 ML: 2.5; .005 INJECTION, SOLUTION INFILTRATION; PERINEURAL at 08:06

## 2023-01-09 RX ADMIN — GLYCOPYRROLATE 0.6 MG: 0.2 INJECTION, SOLUTION INTRAMUSCULAR; INTRAVENOUS at 11:49

## 2023-01-09 RX ADMIN — FENTANYL CITRATE 25 MCG: 50 INJECTION, SOLUTION INTRAMUSCULAR; INTRAVENOUS at 07:50

## 2023-01-09 RX ADMIN — DEXMEDETOMIDINE HYDROCHLORIDE 12 MCG: 200 INJECTION INTRAVENOUS at 08:26

## 2023-01-09 RX ADMIN — INDOCYANINE GREEN AND WATER 10 MG: KIT at 11:33

## 2023-01-09 RX ADMIN — FENTANYL CITRATE 25 MCG: 50 INJECTION, SOLUTION INTRAMUSCULAR; INTRAVENOUS at 07:42

## 2023-01-09 RX ADMIN — ONDANSETRON 4 MG: 2 INJECTION INTRAMUSCULAR; INTRAVENOUS at 08:12

## 2023-01-09 RX ADMIN — PROPOFOL 200 MG: 10 INJECTION, EMULSION INTRAVENOUS at 07:50

## 2023-01-09 RX ADMIN — ONDANSETRON 4 MG: 2 INJECTION INTRAMUSCULAR; INTRAVENOUS at 15:33

## 2023-01-09 RX ADMIN — SODIUM CHLORIDE: 9 INJECTION, SOLUTION INTRAVENOUS at 14:17

## 2023-01-09 RX ADMIN — ONDANSETRON 4 MG: 2 INJECTION INTRAMUSCULAR; INTRAVENOUS at 20:09

## 2023-01-09 RX ADMIN — ACETAMINOPHEN 650 MG: 325 TABLET, FILM COATED ORAL at 15:16

## 2023-01-09 RX ADMIN — GABAPENTIN 600 MG: 600 TABLET, FILM COATED ORAL at 06:59

## 2023-01-09 RX ADMIN — LIDOCAINE HYDROCHLORIDE 100 MG: 20 INJECTION, SOLUTION INFILTRATION; PERINEURAL at 07:50

## 2023-01-09 RX ADMIN — FENTANYL CITRATE 50 MCG: 50 INJECTION, SOLUTION INTRAMUSCULAR; INTRAVENOUS at 07:56

## 2023-01-09 RX ADMIN — SODIUM CHLORIDE: 9 INJECTION, SOLUTION INTRAVENOUS at 23:59

## 2023-01-09 ASSESSMENT — ACTIVITIES OF DAILY LIVING (ADL)
ADLS_ACUITY_SCORE: 35
ADLS_ACUITY_SCORE: 37
ADLS_ACUITY_SCORE: 35
ADLS_ACUITY_SCORE: 35
ADLS_ACUITY_SCORE: 37
ADLS_ACUITY_SCORE: 39
ADLS_ACUITY_SCORE: 35
ADLS_ACUITY_SCORE: 35
ADLS_ACUITY_SCORE: 37

## 2023-01-09 ASSESSMENT — ENCOUNTER SYMPTOMS: SEIZURES: 0

## 2023-01-09 NOTE — ANESTHESIA POSTPROCEDURE EVALUATION
Patient: Meche Morrow    Procedure: Procedure(s):  bilateral skin sparing mastectomies with right sentinel lymph node biopsy  BILATERAL DIRECT TO IMPLANT PREPECTORAL BREAST RECONSTRUCTION WITH ALLODERM DIRECT TO IMPLANT       Anesthesia Type:  General    Note:     Postop Pain Control: Uneventful            Sign Out: Well controlled pain   PONV: No   Neuro/Psych: Uneventful            Sign Out: Acceptable/Baseline neuro status   Airway/Respiratory: Uneventful            Sign Out: Acceptable/Baseline resp. status   CV/Hemodynamics: Uneventful            Sign Out: Acceptable CV status; No obvious hypovolemia; No obvious fluid overload   Other NRE: NONE   DID A NON-ROUTINE EVENT OCCUR? No           Last vitals:  Vitals Value Taken Time   /69 01/09/23 1300   Temp     Pulse 56 01/09/23 1311   Resp 15 01/09/23 1311   SpO2 96 % 01/09/23 1311   Vitals shown include unvalidated device data.    Electronically Signed By: Wayne Christianson MD  January 9, 2023  1:13 PM

## 2023-01-09 NOTE — OR NURSING
Phone report given to RN and pt sent, with 2 belongings bags, to room 2222-1.  Transfer OK'd by Dr. Christianson.  Pt's  updated.

## 2023-01-09 NOTE — ANESTHESIA PROCEDURE NOTES
Airway       Patient location during procedure: OR       Procedure Start/Stop Times: 1/9/2023 7:54 AM  Staff -        Performed By: CRNA  Consent for Airway        Urgency: elective  Indications and Patient Condition       Indications for airway management: kay-procedural       Induction type:intravenous       Mask difficulty assessment: 2 - vent by mask + OA or adjuvant +/- NMBA    Final Airway Details       Final airway type: endotracheal airway       Successful airway: ETT - single and Oral  Endotracheal Airway Details        ETT size (mm): 7.0       Cuffed: yes       Successful intubation technique: video laryngoscopy       VL Blade Size: Glidescope 3       Grade View of Cords: 1       Adjucts: stylet       Position: Right       Measured from: lips       Secured at (cm): 21       Bite block used: Soft    Post intubation assessment        Placement verified by: capnometry, equal breath sounds and chest rise        Number of attempts at approach: 1       Secured with: pink tape       Ease of procedure: easy       Dentition: Intact and Unchanged    Medication(s) Administered   Medication Administration Time: 1/9/2023 7:54 AM

## 2023-01-09 NOTE — INTERVAL H&P NOTE
"I have reviewed the surgical (or preoperative) H&P that is linked to this encounter, and examined the patient. There are no significant changes    Clinical Conditions Present on Arrival:  Clinically Significant Risk Factors Present on Admission                    # Obesity: Estimated body mass index is 32.92 kg/m  as calculated from the following:    Height as of this encounter: 1.575 m (5' 2\").    Weight as of this encounter: 81.6 kg (180 lb).       "

## 2023-01-09 NOTE — PLAN OF CARE
Goal Outcome Evaluation:       Patient came up from SD PACU at 1400. VSS on RA. Dressing CDI. Up walking to BR with A1, voiding spontaneously. JPx4 drain in place. JUDIT drain education given to  and patient at bedside. They verbalized understanding. Zofran resolved with nausea. Oxycodone and tylenol given for pain. Continue to monitor.

## 2023-01-09 NOTE — BRIEF OP NOTE
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    Drains: Gilles-Jama x 4  Specimens:   ID Type Source Tests Collected by Time Destination   1 : RIGHT AXILLARY SENTINEL LYMPH NODE #1 Tissue Lymph Node(s), Axillary, Right SURGICAL PATHOLOGY EXAM Sandra Ulloa MD 1/9/2023  9:13 AM    2 : RIGHT AXILLARY SENTINEL LYMPH NODE #2 Tissue Lymph Node(s), Axillary, Right SURGICAL PATHOLOGY EXAM Sandra Ulloa MD 1/9/2023  9:14 AM    3 : RIGHT AXILLARY SENTINEL LYMPH NODE #3 Tissue Lymph Node(s), Axillary, Right SURGICAL PATHOLOGY EXAM Sandra Ulloa MD 1/9/2023  9:16 AM    4 : RIGHT BREAST MASTECTOMY, SHORT STITCH-SUPERIOR, LONG STITCH-LATERAL Mastectomy, Simple Breast, Right SURGICAL PATHOLOGY EXAM Sandra Ulloa MD 1/9/2023  9:19 AM    5 : LEFT BREAST MASTECTOMY, SHORT STITCH-SUPERIOR, LONG STITCH-LATERAL Mastectomy, Simple Breast, Left SURGICAL PATHOLOGY EXAM Sandra Ulloa MD 1/9/2023 10:03 AM      Findings:   None.  Complications: None.  Implants:   Implant Name Type Inv. Item Serial No.  Lot No. LRB No. Used Action   ALLODERM SELECT RTU    [employer]GAN [id number] Right 1 Implanted   GRAFT ALLODERM [id number]  [id number] CHARGE PER SQ CM= 320 UNITS - [id number] Bone/Tissue/Biologic  GRAFT ALLODERM [id number]  [id number] CHARGE PER SQ CM= 320 UNITS  [employer] [id number] Right 1 Implanted   Anne heathVarna softtouch vreast impland ssf style 560cc   [id number] ALLERGAN  Right 1 Implanted

## 2023-01-09 NOTE — ANESTHESIA PROCEDURE NOTES
Pectoralis Procedure Note    Pre-Procedure   Staff -        Anesthesiologist:  Wayne Christianson MD       Performed By: Anesthesiologist       Location: pre-op       Pre-Anesthestic Checklist: patient identified, IV checked, site marked, risks and benefits discussed, informed consent, monitors and equipment checked, pre-op evaluation, at physician/surgeon's request and post-op pain management  Timeout:       Correct Patient: Yes        Correct Procedure: Yes        Correct Site: Yes        Correct Position: Yes        Correct Laterality: Yes        Site Marked: Yes  Procedure Documentation  Procedure: Pectoralis             Pectoralis I and Pectoralis II       Laterality: right       Patient Position: supine       Skin prep: Chloraprep       Local skin infiltrated with 3 mL of 1% lidocaine.        Needle Type: insulated and short bevel       Needle Gauge: 21.        Needle Length (millimeters): 100        Ultrasound guided       1. Ultrasound was used to identify targeted nerve, plexus, vascular marker, or fascial plane and place a needle adjacent to it in real-time.       2. Ultrasound was used to visualize the spread of anesthetic in close proximity to the above referenced structure.       3. A permanent image is entered into the patient's record.       4. The visualized anatomic structures appeared normal.       5. There were no apparent abnormal pathologic findings.    Assessment/Narrative         The placement was negative for: blood aspirated, painful injection and site bleeding       Paresthesias: No.       Bolus given via needle. no blood aspirated via catheter.        Secured via.        Insertion/Infusion Method: Single Shot       Complications: none    Medication(s) Administered   Bupivacaine 0.25% w/ 1:400K Epi (Injection) - Injection   30 mL - 1/9/2023 7:58:00 AM   Comments:  Ultrasound Interpretation, Peripheral Nerve Block    1. Under ultrasound guidance, the needle was inserted and placed in close  "proximity to the target nerve(s).  2. Ultrasound was also used to visualize the spread of the anesthetic in close proximity to the nerve(s) being blocked.  Local anesthetic was administered in incremental doses, with intermittent negative aspiration.    3. The nerve(s) appeared anatomically normal.  4. There were no apparent abnormal pathological findings.  5. A permanent ultrasound image was saved in the patient's record.    Pt tolerated well.    No complications.      10 ml 0.25% bupivacaine with epi for PEC 1  20 ml 0.25% bupivacaine with epi for PEC 2    The surgeon has given a verbal order transferring care of this patient to me for the performance of a regional analgesia block for post-op pain control. It is requested of me because I am uniquely trained and qualified to perform this block and the surgeon is neither trained nor qualified to perform this procedure.    Block placed in operating room post induction      FOR [facility] ([facility]) ONLY:   Pain Team Contact information: please page the Pain Team [address] [employer]. Search \"Pain\". During daytime hours, please page the attending first. At night please page the resident first.    "

## 2023-01-09 NOTE — OP NOTE
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]     POSTOPERATIVE DIAGNOSIS:   Same as preoperative diagnosis     PROCEDURES PERFORMED:   Procedure(s):  bilateral skin sparing mastectomies with right sentinel lymph node biopsy  BILATERAL DIRECT TO IMPLANT PREPECTORAL BREAST RECONSTRUCTION WITH ALLODERM DIRECT TO IMPLANT     SURGEON(S):   Surgeon(s) and Role:  Panel 1:     * Sandra Ulloa MD - Primary     * Marlene Gonzalez PA-C - Assisting  Panel 2:     * Earnestine Peace MD - Primary     * Clare Tyler PA-C - Assisting     ASSISTANT:                 ESTIMATED BLOOD LOSS:   KIRA Mendez PA-C, was present and scrubbed for the entire procedure and assisted with dissection, retraction and closure.  There were no other qualified trainees or other assistants available and the presence of Betty Tyler PA-C was necessary due to inability to retract and dissect without an assistant.    45 mL      SPECIMENS:   ID Type Source Tests Collected by Time Destination   1 : RIGHT AXILLARY SENTINEL LYMPH NODE #1 Tissue Lymph Node(s), Axillary, Right SURGICAL PATHOLOGY EXAM Sandra Ulloa MD 1/9/2023  9:13 AM    2 : RIGHT AXILLARY SENTINEL LYMPH NODE #2 Tissue Lymph Node(s), Axillary, Right SURGICAL PATHOLOGY EXAM Sandra Ulloa MD 1/9/2023  9:14 AM    3 : RIGHT AXILLARY SENTINEL LYMPH NODE #3 Tissue Lymph Node(s), Axillary, Right SURGICAL PATHOLOGY EXAM Sandra Ulloa MD 1/9/2023  9:16 AM    4 : RIGHT BREAST MASTECTOMY, SHORT STITCH-SUPERIOR, LONG STITCH-LATERAL Mastectomy, Simple Breast, Right SURGICAL PATHOLOGY EXAM Sandra Ulloa MD 1/9/2023  9:19 AM    5 : LEFT BREAST MASTECTOMY, SHORT STITCH-SUPERIOR, LONG STITCH-LATERAL Mastectomy, Simple Breast, Left SURGICAL PATHOLOGY EXAM Sandra Ulloa MD 1/9/2023 10:03 AM             DRAINAGE  TYPE: Two 15-Russian channel drains to each breast.    INDICATIONS FOR PROCEDURE:  The patient is a 32-year-old female diagnosed with right breast DCIS noted when she was 35 weeks pregnant. She was induced on December 18, 2022 and has now elected to undergo bilateral skin-sparing mastectomies and immediate prepectoral direct-to-implant breast reconstruction with AlloDerm for soft tissue reinforcement.  A written witnessed informed consent was obtained preoperatively.     DESCRIPTION OF PROCEDURE:  The patient was identified and marked in the preoperative holding area for bilateral skin-sparing mastectomies using vertical elliptical incisions.  She was then taken to the operating room and placed supine on the operating table.  The case was then handed over to Dr. Ulloa's team.  I was called into the operating room once the mastectomies were completed and negative margins were obtained. Following this, 5 mL of indocyanine green dye were injected intravenously to assess the perfusion of the mastectomy skin flaps using the SPY Elite system for intraoperative fluorescent angiography.  The perfusion was found to be excellent on both sides.  Given this, it was decided to proceed with breast reconstruction as planned.      The patient's base diameter is 13 cm. I first selected Natrelle silicone gel sizer, full profile, 560 mL. This appeared to be an excellent fit. Given this, I selected a Natrelle Inspira Soft Touch breast implant SSF-560 for each side. The breast implant used on the right side has serial number of [id number] and the serial number of the breast implant used for the left side is [id number].  I then selected a sheet of AlloDerm measuring 16 x 20 cm ready-to-use perforated and the AlloDerm was first soaked in saline and then circumferentially wrapped around the breast implant and secured on the back side with 2-0 Monocryl.  The lot number for the AlloDerm used on the right side is [id number] and the lot  number for the AlloDerm used on the left side is [id number].  Once this was completed, both implants and AlloDerm were soaked in half-strength Betadine until they were ready to use.       I then turned my attention to the chest and confirmed hemostasis in both prepectoral pockets.  A 15-Greenlandic channel drain was placed in each prepectoral pocket and secured to the skin with 2-0 silk.  Each prepectoral pocket was then irrigated with half-strength Betadine, which was soaked for 5 minutes.  Following this, the skin was prepared with Betadine and protected with blue towels.  I then changed my gloves and inserted on the right side the breast implant and AlloDerm.  I secured the AlloDerm tabs to the chest wall using 2-0 PDS.  The same thing was done on the left side. The incision was then closed in layers with deep dermal 3-0 Monocryl, followed by a subcuticular 4-0 Monocryl on both sides.  At the end of the procedure, the SPY was used once again as described above to confirm that the mastectomy skin flap still had adequate perfusion and this was confirmed.  Both incisions were covered with Exofin Fusion.  The breasts were then covered with ABD pads and the patient was placed in a surgical bra.  The drain sites were covered with Biopatch and Tegaderm.  At the end of the procedure, all sponge, needle and instrument counts were correct.  The patient was then awakened and sent to the recovery room in satisfactory condition.      I was present for the entire case.     Earnestine Peace MD

## 2023-01-09 NOTE — OP NOTE
[facility] Breast Surgery Operative Note    PREOPERATIVE DIAGNOSIS:  Right breast DCIS    POSTOPERATIVE DIAGNOSIS:  Same, pathology pending    PROCEDURE:    1.  Bilateral skin sparing mastectomies  2. RIGHT sentinel lymph node biopsy  3. Reconstruction per Dr. Peace, please see their operative report for details regarding their portion of the procedure.     SURGEON:  Sandra Ulloa MD    ASSISTANT:  Marlene Gonzalez PA-C  The physician s assistant was medically necessary for their expertise in retraction and suctioning.    ANESTHESIA:  General.    BLOOD LOSS: 40ml    FINDINGS: three clinically normal right axillary lymph nodes    INDICATIONS:   Meche is a 32-year-old female who presented with a right breast mass which she noted around 35 weeks pregnancy.  She had evaluation with imaging and core needle biopsy which revealed ductal carcinoma in situ.  An additional biopsy confirmed ductal carcinoma in situ and there is no invasive component identified.  She had a right axillary lymph node which was enlarged and this was biopsied and benign.  She elected to proceed with bilateral skin sparing mastectomies with right sentinel lymph node biopsy and immediate reconstruction with Dr. Peace    DETAILS OF PROCEDURE:     The patient was taken to the operating room and placed in supine position and general anesthesia by endotracheal tube.   Perioperative antiobiotics were given. SCDs were placed on the lower extremities. A mckay catheter was placed using sterile technique. Prior to coming to the operating room, patient had the right breast injected with radiolabeled sulfur colloid.   The patient was also marked by Dr. Peace with vertically oriented ellipse incisions. The breasts and axilla were prepped with ChloraPrep and draped in the usual sterile fashion.  The timeout procedure was performed.      Starting on the right side, a vertically oriented periareolar ellipse incision was made following the pre operative markings  with a #10 scalpel blade and deepened with electrocautery.  The superior flap was raised with electrocautery along the anterior mammary fascial plane up to the top edge of the breast tissue just under the clavicle.   There were lactational changes within the breast tissue and some milk within the field with dissection.  The inferior flap was similarly raised using the cautery down to the inframammary fold.  Flaps were raised medially to the lateral aspect of the sternum and laterally to the lateral chest wall. The breast tissue was then elevated off of the pectoralis fascia with cautery. The fascia was excised with the breast tissue.  Once the breast was removed, it was oriented with sutures with a short suture superior and long suture lateral.  The breast was sent to pathology to be placed in formalin and have routine pathology exam.        Attention was then turned to the right axilla.  The fascia was incised along the edge of the pectoralis muscle.  The Neoprobe was used to identify the site of increased radioactivity.  3 nodes were excised which had counts of over 200, 300 and 8.   The nodes were sent to pathology for review.  We irrigated the chest cavity with several liters of warm sterile saline.  The right side was packed with a saline-wetted lap pad and covered with a dry towel while attention was turned to the right breast.     A similar  incision was made on the left side with a #10 scalpel blade and deepened with electrocautery.  Again, the flaps were raised with electrocautery and the breast was dissected away from the pectoralis fascia.  The tissue was oriented with a short suture superior and long suture lateral.  The breast was then sent to pathology and placed in formalin for permanent section.    We again irrigated the left chest with several liters of warm sterile saline.  The left side was packed with a saline wet and laparotomy pad and covered with a dry towel.      Dr. Peace then performed  their portion of the procedure with reconstruction. Please see their operative report for details.     Sandra Ulloa MD

## 2023-01-09 NOTE — ANESTHESIA PROCEDURE NOTES
Pectoralis Procedure Note    Pre-Procedure   Staff -        Anesthesiologist:  Wayne Christianson MD       Performed By: Anesthesiologist       Location: pre-op       Pre-Anesthestic Checklist: patient identified, IV checked, site marked, risks and benefits discussed, informed consent, monitors and equipment checked, pre-op evaluation, at physician/surgeon's request and post-op pain management  Timeout:       Correct Patient: Yes        Correct Procedure: Yes        Correct Site: Yes        Correct Position: Yes        Correct Laterality: Yes        Site Marked: Yes  Procedure Documentation  Procedure: Pectoralis             Pectoralis I and Pectoralis II       Laterality: left       Patient Position: supine       Skin prep: Chloraprep       Local skin infiltrated with 3 mL of 1% lidocaine.        Needle Type: insulated and short bevel       Needle Gauge: 21.        Needle Length (millimeters): 100        Ultrasound guided       1. Ultrasound was used to identify targeted nerve, plexus, vascular marker, or fascial plane and place a needle adjacent to it in real-time.       2. Ultrasound was used to visualize the spread of anesthetic in close proximity to the above referenced structure.       3. A permanent image is entered into the patient's record.       4. The visualized anatomic structures appeared normal.       5. There were no apparent abnormal pathologic findings.    Assessment/Narrative         The placement was negative for: blood aspirated, painful injection and site bleeding       Paresthesias: No.       Bolus given via needle. no blood aspirated via catheter.        Secured via.        Insertion/Infusion Method: Single Shot       Complications: none    Medication(s) Administered   Bupivacaine 0.25% w/ 1:200K Epi (Injection) - Injection   30 mL - 1/9/2023 8:06:00 AM   Comments:  Ultrasound Interpretation, Peripheral Nerve Block    1. Under ultrasound guidance, the needle was inserted and placed in close  "proximity to the target nerve(s).  2. Ultrasound was also used to visualize the spread of the anesthetic in close proximity to the nerve(s) being blocked.  Local anesthetic was administered in incremental doses, with intermittent negative aspiration.    3. The nerve(s) appeared anatomically normal.  4. There were no apparent abnormal pathological findings.  5. A permanent ultrasound image was saved in the patient's record.    Pt tolerated well.    No complications.      10 ml PEC 1  20 ml PEC 2    The surgeon has given a verbal order transferring care of this patient to me for the performance of a regional analgesia block for post-op pain control. It is requested of me because I am uniquely trained and qualified to perform this block and the surgeon is neither trained nor qualified to perform this procedure.    Block placed in operating room post induction      FOR [facility] ([facility] ONLY:   Pain Team Contact information: please page the Pain Team Via [employer]. Search \"Pain\". During daytime hours, please page the attending first. At night please page the resident first.    "

## 2023-01-09 NOTE — ANESTHESIA CARE TRANSFER NOTE
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    Anesthesia Type:   General     Note:    Oropharynx: oropharynx clear of all foreign objects  Level of Consciousness: drowsy  Oxygen Supplementation: face mask  Level of Supplemental Oxygen (L/min / FiO2): 6  Independent Airway: airway patency satisfactory and stable  Dentition: dentition unchanged  Vital Signs Stable: post-procedure vital signs reviewed and stable  Report to RN Given: handoff report given  Patient transferred to: PACU  Comments: To PACU: Arouses easily, good airway, 02 face mask, VSS  Report to RN  Handoff Report: Identifed the Patient, Identified the Reponsible Provider, Reviewed the pertinent medical history, Discussed the surgical course, Reviewed Intra-OP anesthesia mangement and issues during anesthesia, Set expectations for post-procedure period and Allowed opportunity for questions and acknowledgement of understanding      Vitals:  Vitals Value Taken Time   BP     Temp     Pulse 66 01/09/23 1220   Resp 17 01/09/23 1220   SpO2 100 % 01/09/23 1220   Vitals shown include unvalidated device data.    Electronically Signed By: MOLINA Antunez CRNA  January 9, 2023  12:21 PM

## 2023-01-10 VITALS
DIASTOLIC BLOOD PRESSURE: 73 MMHG | SYSTOLIC BLOOD PRESSURE: 125 MMHG | HEART RATE: 67 BPM | WEIGHT: 180 LBS | RESPIRATION RATE: 18 BRPM | HEIGHT: 62 IN | OXYGEN SATURATION: 94 % | BODY MASS INDEX: 33.13 KG/M2 | TEMPERATURE: 99.1 F

## 2023-01-10 PROCEDURE — 250N000011 HC RX IP 250 OP 636: Performed by: PHYSICIAN ASSISTANT

## 2023-01-10 PROCEDURE — 250N000013 HC RX MED GY IP 250 OP 250 PS 637: Performed by: SURGERY

## 2023-01-10 PROCEDURE — 250N000013 HC RX MED GY IP 250 OP 250 PS 637: Performed by: PHYSICIAN ASSISTANT

## 2023-01-10 RX ORDER — SULFAMETHOXAZOLE/TRIMETHOPRIM 800-160 MG
1 TABLET ORAL 2 TIMES DAILY
Qty: 32 TABLET | Refills: 0 | Status: SHIPPED | OUTPATIENT
Start: 2023-01-10 | End: 2023-08-17

## 2023-01-10 RX ORDER — METHOCARBAMOL 500 MG/1
500 TABLET, FILM COATED ORAL 4 TIMES DAILY
Status: DISCONTINUED | OUTPATIENT
Start: 2023-01-10 | End: 2023-01-10 | Stop reason: HOSPADM

## 2023-01-10 RX ORDER — AMOXICILLIN 250 MG
1 CAPSULE ORAL 2 TIMES DAILY
Qty: 30 TABLET | Refills: 0 | Status: SHIPPED | OUTPATIENT
Start: 2023-01-10 | End: 2023-08-17

## 2023-01-10 RX ORDER — METHOCARBAMOL 500 MG/1
500 TABLET, FILM COATED ORAL 4 TIMES DAILY
Qty: 30 TABLET | Refills: 0 | Status: SHIPPED | OUTPATIENT
Start: 2023-01-10 | End: 2023-08-17

## 2023-01-10 RX ORDER — ACETAMINOPHEN 325 MG/1
650 TABLET ORAL EVERY 6 HOURS
Status: DISCONTINUED | OUTPATIENT
Start: 2023-01-10 | End: 2023-01-10 | Stop reason: HOSPADM

## 2023-01-10 RX ORDER — OXYCODONE HYDROCHLORIDE 5 MG/1
5 TABLET ORAL EVERY 6 HOURS PRN
Qty: 25 TABLET | Refills: 0 | Status: SHIPPED | OUTPATIENT
Start: 2023-01-10 | End: 2023-08-17

## 2023-01-10 RX ADMIN — OXYCODONE HYDROCHLORIDE 5 MG: 5 TABLET ORAL at 12:16

## 2023-01-10 RX ADMIN — ACETAMINOPHEN 650 MG: 325 TABLET, FILM COATED ORAL at 12:16

## 2023-01-10 RX ADMIN — SULFAMETHOXAZOLE AND TRIMETHOPRIM 1 TABLET: 800; 160 TABLET ORAL at 08:10

## 2023-01-10 RX ADMIN — ONDANSETRON 4 MG: 4 TABLET, ORALLY DISINTEGRATING ORAL at 12:20

## 2023-01-10 RX ADMIN — OXYCODONE HYDROCHLORIDE 5 MG: 5 TABLET ORAL at 00:21

## 2023-01-10 RX ADMIN — METHOCARBAMOL 500 MG: 500 TABLET ORAL at 08:10

## 2023-01-10 RX ADMIN — ACETAMINOPHEN 650 MG: 325 TABLET, FILM COATED ORAL at 06:28

## 2023-01-10 ASSESSMENT — ACTIVITIES OF DAILY LIVING (ADL)
ADLS_ACUITY_SCORE: 37

## 2023-01-10 NOTE — PROGRESS NOTES
"[facility]  General Surgery Progress Note    Admission Date: 2023  1/10/2023         Assessment and Plan:     Meche Morrow is a 32 year old female S/P Procedure(s):  bilateral skin sparing mastectomies with right sentinel lymph node biopsy  BILATERAL DIRECT TO IMPLANT PREPECTORAL BREAST RECONSTRUCTION WITH ALLODERM DIRECT TO IMPLANT 1 Day Post-Op    - ADAT  - Pain controlled  - Ambulate 4x day and encourage IS at home  - Home today, added Senna and Robaxin  - JUDIT drain teaching  - Follow up with Dr. Ulloa in 2-4 weeks  - DC instructions discussed              Interval History:     Pain controlled, tolerating diet, urinating without difficulty, ambulating. Meds reviewed.                    Physical Exam:   Blood pressure 125/73, pulse 67, temperature 99.1  F (37.3  C), temperature source Oral, resp. rate 18, height 1.575 m (5' 2\"), weight 81.6 kg (180 lb), last menstrual period 2022, SpO2 94 %, currently breastfeeding.  Temperature Temp  Av.2  F (36.8  C)  Min: 97  F (36.1  C)  Max: 99.1  F (37.3  C)   I/O last 3 completed shifts:  In: 3507 [I.V.:3507]  Out: 1595 [Urine:1150; Drains:400; Blood:45]  Constitutional:  Awake and in no apparent distress.   Lungs: No increased work of breathing, good air exchange, clear to auscultation bilaterally, and no crackles or wheezing.   Cardiovascular: Regular rate and rhythm, normal S1 and S2, and no murmur noted.   Chest Wound(s): Appropriately tender. Clean, dry, and intact. No erythema or drainage. No palpable seroma/hematoma. JUDIT drain(s) intact without leakage, serosanguinous. Minimal central ecchymosis.   Extremities: Non-tender and without significant swelling to upper extremities. No edema or calf tenderness.          Data:   No new labs/imaging.     Marlene Gonzalez PA-C  [facility]  [phone]  "

## 2023-01-10 NOTE — DISCHARGE SUMMARY
[facility]    Discharge Summary  Plastic and Reconstructive Surgery    Date of Admission:  1/9/2023  Date of Discharge:  1/10/2023  Discharging Provider: Clare Tyler PA-C    Discharge Diagnoses   -bilateral skin sparing mastectomies with right sentinel lymph node biopsy  -BILATERAL DIRECT TO IMPLANT PREPECTORAL BREAST RECONSTRUCTION WITH ALLODERM DIRECT TO IMPLANT    Follow-ups Needed After Discharge   Follow-up Appointments     Follow Up ([facility])      Follow up with Dr. Peace/Betty, at [facility], on   01/17 at 11:15am to evaluate after surgery. No follow up labs or test are   needed.             Unresulted Labs Ordered in the Past 30 Days of this Admission     Date and Time Order Name Status Description    1/9/2023  9:14 AM Surgical Pathology Exam In process       These results will be followed up by Dr. Ulloa    Discharge Disposition   Discharged to home  Condition at discharge: Stable      Hospital Course   Meche Morrow was admitted on 1/9/2023.  The following problems were addressed during her hospitalization:    -breast cancer  -bilateral skin sparing mastectomies with right sentinel lymph node biopsy  -BILATERAL DIRECT TO IMPLANT PREPECTORAL BREAST RECONSTRUCTION WITH ALLODERM DIRECT TO IMPLANT    Consultations This Hospital Stay   None    Code Status   Full Code    Time Spent on this Encounter   I, Clare Tyler PA-C, personally saw the patient today and spent greater than 30 minutes discharging this patient.       Clare Tyler PA-C  [facility]  ______________________________________________________________________        Primary Care Physician   [facility] [place]    Discharge Orders      When to call - Contact Surgeon Team    Please inspect your incisions daily for signs and symptoms of infection. These include but are not limited to high fever, increased redness, swelling, or pain around the  incisions, discharge from the incisions, or separation of the incisions. Should any of the above occur, you should contact Dr. Peace's office.  During evening or weekend hours, you may contact a member of Dr. Peace team by calling [phone] and ask to speak with Dr. Peace or the physician on call. For questions during the week, Monday - Friday 8-4:30pm, please call [phone] and ask for Dr. Peace.     Symptoms - Fever Management    A low grade fever can be expected after surgery. Your Provider many have prescribed an Opioid pain medication that also contains acetaminophen (TYLENOL) that may help with Fever management.  Read the labels on your Over The Counter (OTC) medications with care.     No driving or operating machinery    Do NOT drive any vehicle or operate mechanical equipment for 24 hours following the end of your surgery.  Even though you may feel normal, your reactions may be affected by Anesthesia medication you received. Also, do not drive if you are taking a narcotic after surgery.     No Alcohol    Do NOT drink alcoholic beverages for 24 hours following your surgery and while taking pain medications.     Diet Instructions    DIET:  To optimize healing, it is essential to have adequate nutritional intake. Studies have shown drastically improved outcomes for patients with a sound nutritional state. A balanced diet with recommended daily servings of lean protein, vegetables and grains can help ensure adequate nutrition. It is equally important to drink water throughout the day. This will help prevent constipation.     Shower/Bathing - Restrictions (specify)    You may shower 24 hours after surgery. Be careful not to rub on the incisions, just pat try please. Please DO NOT SOAK IN BATH WATER OR HOT TUB or swim in pools/lakes for 3 months following your surgery.     Dressing / Wound Care - Wound    Your surgical incisions are covered with surgical tape and glue. You do not need to remove  these dressings at all. If some of the edges of the dressings start to peel off, you may trim them carefully to keep the rest of the dressing in place.    CARING FOR YOUR DRAIN:  Strip your drains every 6-8 hours. Please empty your drains several times per day as instructed and record daily output in the booklet provided. Bring this booklet with you to your follow-up appointment. The drains will be removed once the daily output is 30 cc/24 hrs for 2 days. Please keep a detailed logbook of the output of your drains and bring it with you to your follow up appointment with Dr. Peace. Keep the area around where the tube exits the skin dry and covered. Empty the container when it becomes half full. The container will not create suction if it becomes too full. Keep the container compressed at all times except when emptying it. The compression creates a gentle suction. Record the amount of fluid you empty each time. Total the amount of fluid you empty in each 24 hour period. Bring this record to your next appointment with Dr. Peace. If you see a blood clot in the tube, leave it unless instructed otherwise by your physician. However, if the tube does not appear to   be draining, contact Dr. Peace.   To empty the container (or bulb):   1. Open the stopper to release the suction.   2. Holding the stopper out of the way, pour the fluid into the measuring cup provided to you.   3. Measure and record the amount of each drain SEPARATELY.   4. Compress the container.   5. Replace the stopper.   6. Pin the tube to your clothes.    Here is a link with a video to remind you how to care for your drains:  [email or web address].com/[email or web address]     Discharge Instructions - Comfort and Pain Management    Pain after surgery is normal and expected. You will have some amount of pain after surgery. Your pain will improve with time.     You have been given a prescription for narcotics. Do not drive or drink alcohol  while taking this medication. Do not take more than 6 tablets per day. Please use Senokot daily while taking the narcotics to prevent constipation.     We recommend that you take 1000 mg of Tylenol four times daily. If this does not manage your pain, then take the prescribed narcotic pain medication as needed (every 4-6 hours as needed). Please ensure that you do not exceed 4000 mg of Tylenol/acetaminophen per day. Do not take ibuprofen for the first 3 days after surgery. Once you are 3 days postop, you should begin implementing ibuprofen 2-4 times daily along with the Tylenol to help wean off the narcotic as soon as you can.     You may have a dry mouth, a sore throat, muscles aches or trouble sleeping, and these symptoms should go away after 24 hours.     Discharge Instructions - Rest    Rest and relax for the next 24 hours. Make arrangements to have someone stay with you overnight, and avoid hazardous and strenuous activities.  Do NOT make any important decisions for the next 24 hours.    Be careful not to put pressure on the breasts, especially at the center of your chest. Please do not engage in repetitive upper body activities (cleaning windows, vacuuming, etc.) or aerobic exercise for the first 4 weeks. Do gentle exercises to increase comfort and range of motion in shoulders and arms. Do not raise your arm above shoulder level on the operated side for 4 weeks after surgery. Please keep your heart rate below 120 beats per minute during the first 4 weeks. No lifting more than 15 pounds or any strenuous activity for 4 weeks after surgery. Then gradually increase your activity as tolerated. Do not use heat or ice packs on the breast or surgical areas. Please do not smoke, or be exposed to second hand smoke. DO NOT sleep on your stomach for 4 weeks after surgery.     Other Activity restrictions (specify):    Activity:  - Get regular activity and try to walk for a total of 30 minutes per day.     -Start by walking  for 5 to 10 minutes at one time and slowly build to walking for 30 minutes one time.  - Walk often; for 10 minutes four to five times a day.   - Increase the amount of time you walk as you can tolerate.   - Rest is also an important part of healing.    - Slowly return to your regular level of activity.   - Save your energy by spreading out activities that make you tired.  - Rest as needed.   - Avoid strenuous exercise for 4 weeks    Other activities:  Be careful not to put pressure on the breasts, especially at the center of your chest. Please do not engage in repetitive upper body activities (cleaning windows, vacuuming, etc.) or aerobic exercise for the first 4 weeks. Do gentle exercises to increase comfort and range of motion in shoulders and arms. Do not raise your arm above shoulder level on the operated side for 4 weeks after surgery. Please keep your heart rate below 120 beats per minute during the first 4 weeks. No lifting more than 15 pounds or any strenuous activity for 4 weeks after surgery. Then gradually increase your activity as tolerated. Do not use heat or ice packs on the breast or surgical areas. Please do not smoke, or be exposed to second hand smoke. DO NOT sleep on your stomach for 4 weeks after surgery.     Discharge Instructions - Follow-up Appointment (Days)    You should have a scheduled follow up with Dr. Peace/Betty 5-10 days after surgery. If you are unsure of your appointment, please call our office and ask for your appointment date/time. Our office number is [phone].    Follow up with Dr. Ulloa in 2-4 weeks. Call [phone] to schedule an appointment or if you have any concerns. We are located at [address] [place] [address].     Reason for your hospital stay    You were hospitalized for recovery after surgery with Dr. Peace     Activity    Activity:  - Get regular activity and try to walk for a total of 30 minutes per day.     -Start by walking  for 5 to 10 minutes at one time and slowly build to walking for 30 minutes one time.  - Walk often; for 10 minutes four to five times a day.   - Increase the amount of time you walk as you can tolerate.   - Rest is also an important part of healing.    - Slowly return to your regular level of activity.   - Save your energy by spreading out activities that make you tired.  - Rest as needed.   - Avoid strenuous exercise for 4 weeks    Other activities:  Be careful not to put pressure on the breasts, especially at the center of your chest. Please do not engage in repetitive upper body activities (cleaning windows, vacuuming, etc.) or aerobic exercise for the first 4 weeks. Do gentle exercises to increase comfort and range of motion in shoulders and arms. Do not raise your arm above shoulder level on the operated side for 4 weeks after surgery. Please keep your heart rate below 120 beats per minute during the first 4 weeks. No lifting more than 15 pounds or any strenuous activity for 4 weeks after surgery. Then gradually increase your activity as tolerated. Do not use heat or ice packs on the breast or surgical areas. Please do not smoke, or be exposed to second hand smoke. DO NOT sleep on your stomach for 4 weeks after surgery.    DO NOT USE heat or ice packs on the breast on your breasts. Please do not smoke or be exposed to second hand smoke.     Follow Up ([facility])    Follow up with Dr. Peace/Betty, at [facility], on 01/17 at 11:15am to evaluate after surgery. No follow up labs or test are needed.     Diet    Follow this diet upon discharge: Regular       Significant Results and Procedures   Results for orders placed or performed during the hospital encounter of 01/09/23   NM Lymphoscintigraphy Injection only    Narrative    Examination:  NM LYMPHOSCINTIGRAPHY INJECTION ONLY    Date: 1/9/2023 8:00 AM     Indication:  Ductal carcinoma in situ (DCIS) of right breast.    Technique:    510 uCi Tc99m  Lymphoseek was administered intradermally at the 10:00  position, periareolar RIGHT breast.     Images were not obtained as part of the localization procedure.    FER JUNG MD         SYSTEM ID:  [id number]       Discharge Medications   Current Discharge Medication List      START taking these medications    Details   oxyCODONE (ROXICODONE) 5 MG tablet Take 1 tablet (5 mg) by mouth every 6 hours as needed for pain  Qty: 25 tablet, Refills: 0    Associated Diagnoses: Status post bilateral breast reconstruction      senna-docusate (SENOKOT-S/PERICOLACE) 8.6-50 MG tablet Take 1 tablet by mouth 2 times daily  Qty: 30 tablet, Refills: 0    Associated Diagnoses: Ductal carcinoma in situ (DCIS) of right breast      sulfamethoxazole-trimethoprim (BACTRIM DS) 800-160 MG tablet Take 1 tablet by mouth 2 times daily Until all drains are removed  Qty: 32 tablet, Refills: 0    Associated Diagnoses: Status post bilateral breast reconstruction         CONTINUE these medications which have NOT CHANGED    Details   acetaminophen (TYLENOL) 500 MG tablet Take 500-1,000 mg by mouth every 6 hours as needed for mild pain      albuterol (PROAIR HFA/PROVENTIL HFA/VENTOLIN HFA) 108 (90 Base) MCG/ACT inhaler Inhale 2 puffs into the lungs every 6 hours as needed for shortness of breath, wheezing or cough (TRIGGERED BY COLD WINTER AIR)      Prenatal Vit-Fe Fumarate-FA (PRENATAL MULTIVITAMIN W/IRON) 27-0.8 MG tablet Take 1 tablet by mouth daily      valACYclovir (VALTREX) 500 MG tablet Take 1 tablet (500 mg) by mouth daily  Qty: 45 tablet, Refills: 0    Associated Diagnoses: Herpes simplex type 2 (HSV-2) infection affecting pregnancy, antepartum           Allergies   No Known Allergies

## 2023-01-10 NOTE — PLAN OF CARE
Goal Outcome Evaluation:      Plan of Care Reviewed With: patient    Overall Patient Progress: improvingOverall Patient Progress: improving     A&Ox4. VSS on RA. Pain managed with PRN Oxycodone x1 & Tylenol x1. Up SBA with IV pole, ambulated x1 in the villarreal. On a regular diet. Reports int nausea, Zofran given with relief. Up to the bathroom, adequate UOP. Bilateral breast dressings, CDI. JUDIT drains x4 with bright bloody output. Discharge pending. Continue to monitor.

## 2023-01-10 NOTE — PLAN OF CARE
Goal Outcome Evaluation:       Patient doing well. VSS on RA. Up walking in room with SBA. JPx4 in place, stripped. Instruction how to care for it given to  and patient. Supplies given to take home. Incision CDI.  Questions and concerns addressed. Patient discharge to home.

## 2023-01-10 NOTE — PROGRESS NOTES
Daily Post-Op Note - Plastic and Reconstructive Surgery         Assessment and Plan:    Assessment:   Post-operative day #1  Procedure(s):  bilateral skin sparing mastectomies with right sentinel lymph node biopsy  BILATERAL DIRECT TO IMPLANT PREPECTORAL BREAST RECONSTRUCTION WITH ALLODERM DIRECT TO IMPLANT     Doing well.  Clean wound without signs of infection.  No immediate surgical complications identified.  Pain well-controlled with oxycodone and Tylenol. Patient is ambulating without difficulty.       Plan:   -Ambulate  -Advance activity as tolerated  -Pain control measures, going home with oxycodone and stool softener  -Drain teaching when  arrives; on Bactrim until drains are removed.  -Discharge home today            Interval History:   Doing well and ambulating without difficulty.  Pain is well-controlled.  Low grade fever without concern.            Review of Systems:    The patient denies any chest pain, shortness of breath, excessive pain, fever, chills, purulent drainage from the wound, nausea or vomiting.             Medications:   I have reviewed this patient's current medications          Physical Exam:   All vitals stable  General:  Breasts: Incisions clean and dry. No drainage. Surrounding skin with minimal erythema.  Dressing dry and intact. Drains are outputting sanguineous fluid.           Data:   All laboratory data related to this surgery reviewed

## 2023-01-11 LAB
PATH REPORT.COMMENTS IMP SPEC: ABNORMAL
PATH REPORT.COMMENTS IMP SPEC: ABNORMAL
PATH REPORT.COMMENTS IMP SPEC: YES
PATH REPORT.FINAL DX SPEC: ABNORMAL
PATH REPORT.GROSS SPEC: ABNORMAL
PATH REPORT.MICROSCOPIC SPEC OTHER STN: ABNORMAL
PATH REPORT.RELEVANT HX SPEC: ABNORMAL
PATHOLOGY SYNOPTIC REPORT: ABNORMAL
PHOTO IMAGE: ABNORMAL

## 2023-01-11 PROCEDURE — 88307 TISSUE EXAM BY PATHOLOGIST: CPT | Mod: 26 | Performed by: PATHOLOGY

## 2023-01-12 ENCOUNTER — TELEPHONE (OUTPATIENT)
Dept: SURGERY | Facility: CLINIC | Age: 33
End: 2023-01-12

## 2023-01-12 NOTE — CONFIDENTIAL NOTE
I called Meche and discussed her pathology results. It revealed 5.1cm of high grade DCIS, NO invasive cancer! Her lymph nodes were negative and margins are clear. The anterior margin right over the palpable mass was close at 0.4mm but only skin remains. No indication for further surgery or radiation from my perspective.     Sandra Ulloa MD  [facility]  [phone]

## 2023-01-12 NOTE — TELEPHONE ENCOUNTER
Breast Nurse Care Coordination Post Op Call.     Meche is s/p bilateral mastectomies, right sentinel lymph node biopsy with reconstruction on 1/9/2023 with Dr. Ulloa and Dr. Peace. Informed Dr. Ulloa will be in touch with patient soon regarding pathology.     Patient reports good pain control with scheduled Tylenol and then Oxycodone PRN. She just took some oxycodone. She has removed her dressing and the incision is clean, dry, pink and intact. She feels more comfortable leaving a dressing in place over her incisions. She is currently using the old gauze, I told her to use new gauze and change it once a day.  Patient denies fevers. Patient's  has been keeping track of the drain output, she is uncertain how much is coming out. Questions answered about stripping the drain.  She has a follow up appointment early next week with Dr. Peace.     Patient is tolerating fluids, eating a well balanced diet and urinating with no difficulty. Denies nausea. She has been passing gas but hasn't have a BM yet. She is taking senna twice a day. I told her she could use Miralax and mix with equal part prune juice. She is gradually increasing her activity, which will help too.    Informed Meche that Dr. Ulloa would like to see her in clinic 2-3 weeks after surgery for a post op visit. She is scheduled on 2/2/2023 @ 10:15am with Dr. Ulloa at [facility] [place].    She is scheduled to follow up with Dr. Lindsay on 1/20/23 @4pm at [facility] [place].    I informed patient to call me with any questions or concerns. Patient verbalized understanding and agrees with the plan of care.    Beatriz Myles 1/12/2023 8:20 AM    Beatrzi Myles, RN, BSN, PHN  Breast Care Nurse Coordinator  [facility] [place]  [phone]

## 2023-02-02 ENCOUNTER — OFFICE VISIT (OUTPATIENT)
Dept: SURGERY | Facility: CLINIC | Age: 33
End: 2023-02-02
Payer: COMMERCIAL

## 2023-02-02 ENCOUNTER — TELEPHONE (OUTPATIENT)
Dept: OBGYN | Facility: CLINIC | Age: 33
End: 2023-02-02

## 2023-02-02 DIAGNOSIS — Z09 SURGICAL FOLLOW-UP CARE: Primary | ICD-10-CM

## 2023-02-02 PROCEDURE — 99024 POSTOP FOLLOW-UP VISIT: CPT | Performed by: SURGERY

## 2023-02-02 NOTE — LETTER
2/2/2023         RE: Meche Morrow  [id number] [address] [address] [address]        Dear Colleague,    Thank you for referring your patient, Meche Morrow, to the [facility]. Please see a copy of my visit note below.    [facility] Breast Surgery Postoperative Note    S: Meche reports she is doing very well. She has some soreness near bilateral axilla.     Breasts: Bilateral vertical mastectomy incisions are healing well.  There is no erythema or induration.    Pathology: Reviewed and copy given    A/P  Meche Morrow is recovering from bilateral skin sparing mastectomies with right sentinel lymph node biopsy and immediate reconstruction with Dr. Peace on 1/9/2023.  Her pathology revealed extensive ductal carcinoma in situ, grade 3 with focal comedonecrosis and microcalcifications.  The mastectomy margins were all negative and her sentinel lymph nodes were benign.  There was no invasive disease.  The left breast was benign.  Given only DCIS on final pathology there is no role for any adjuvant therapy.  Going forward she should have annual chest wall exam for screening.  I would like to see her back in 6 months.      Thank you for the opportunity to help in her care.    Sandra Ulloa MD  [facility]  [phone]    Please route or send letter to:  Primary Care Provider (PCP) and Referring Provider        Again, thank you for allowing me to participate in the care of your patient.        Sincerely,        Sandra Ulloa MD

## 2023-02-02 NOTE — PROGRESS NOTES
[facility] Breast Surgery Postoperative Note    S: Mehce reports she is doing very well. She has some soreness near bilateral axilla.     Breasts: Bilateral vertical mastectomy incisions are healing well.  There is no erythema or induration.    Pathology: Reviewed and copy given    A/P  Meche Morrow is recovering from bilateral skin sparing mastectomies with right sentinel lymph node biopsy and immediate reconstruction with Dr. Peace on 1/9/2023.  Her pathology revealed extensive ductal carcinoma in situ, grade 3 with focal comedonecrosis and microcalcifications.  The mastectomy margins were all negative and her sentinel lymph nodes were benign.  There was no invasive disease.  The left breast was benign.  Given only DCIS on final pathology there is no role for any adjuvant therapy.  Going forward she should have annual chest wall exam for screening.  I would like to see her back in 6 months.      Thank you for the opportunity to help in her care.    Sandra Ulloa MD  [facility]  [phone]    Please route or send letter to:  Primary Care Provider (PCP) and Referring Provider

## 2023-02-02 NOTE — NURSING NOTE
Meche Morrow's goals for this visit include:   Chief Complaint   Patient presents with     Surgical Followup     2 weeks post of mastectomy with reconstruction       She requests these members of her care team be copied on today's visit information: no    PCP: [facility]    Referring Provider:  No referring provider defined for this encounter.    LMP  (LMP Unknown)     Do you need any medication refills at today's visit? No    Tomeka Dc LPN

## 2023-02-02 NOTE — TELEPHONE ENCOUNTER
Forms received, filled out to the best of my ability, and sent to rightfax awaiting provider's signature.    Guillermina Worley, [facility]  February 2, 2023

## 2023-02-06 ENCOUNTER — TELEPHONE (OUTPATIENT)
Dept: SURGERY | Facility: CLINIC | Age: 33
End: 2023-02-06
Payer: COMMERCIAL

## 2023-02-06 NOTE — TELEPHONE ENCOUNTER
Left Voicemail (1st Attempt) for the patient to call back and schedule the following:    Appointment type: Return  Provider: Dr. Ulloa  Return date: 8/2/2023  Specialty phone number: [phone]  Additonal Notes: Return in about 6 months (around 8/2/2023) for Exam    Ngoc clay Procedure   Dermatology, Surgery, Urology  [facility]

## 2023-02-07 NOTE — TELEPHONE ENCOUNTER
Forms signed and faxed.  Copy sent to scanning.    Guillermina Worley, [facility]  February 7, 2023

## 2023-02-13 ENCOUNTER — TELEPHONE (OUTPATIENT)
Dept: OBGYN | Facility: CLINIC | Age: 33
End: 2023-02-13

## 2023-02-13 ENCOUNTER — MEDICAL CORRESPONDENCE (OUTPATIENT)
Dept: HEALTH INFORMATION MANAGEMENT | Facility: CLINIC | Age: 33
End: 2023-02-13

## 2023-02-13 NOTE — TELEPHONE ENCOUNTER
Reason for Call:  Form, our goal is to have forms completed with 72 hours, however, some forms may require a visit or additional information.    Type of letter, form or note:  medical    Who is the form from?: Patient    Where did the form come from: Patient or family brought in       What clinic location was the form placed at?: [place]    Where the form was placed: OB Box/Folder    What number is listed as a contact on the form?: [phone]       Additional comments: Patient left form at front, would like faxed to number on sheet    Call taken on 2/13/2023 at 11:27 AM by Duglas Gilmore

## 2023-02-16 ENCOUNTER — PRENATAL OFFICE VISIT (OUTPATIENT)
Dept: OBGYN | Facility: CLINIC | Age: 33
End: 2023-02-16
Payer: COMMERCIAL

## 2023-02-16 VITALS
HEART RATE: 74 BPM | SYSTOLIC BLOOD PRESSURE: 132 MMHG | WEIGHT: 178.8 LBS | DIASTOLIC BLOOD PRESSURE: 84 MMHG | OXYGEN SATURATION: 96 % | BODY MASS INDEX: 32.7 KG/M2

## 2023-02-16 DIAGNOSIS — Z30.011 ENCOUNTER FOR INITIAL PRESCRIPTION OF CONTRACEPTIVE PILLS: Primary | ICD-10-CM

## 2023-02-16 PROCEDURE — 99207 PR POST PARTUM EXAM: CPT | Performed by: OBSTETRICS & GYNECOLOGY

## 2023-02-16 RX ORDER — NORGESTIMATE AND ETHINYL ESTRADIOL 0.25-0.035
1 KIT ORAL DAILY
Qty: 28 TABLET | Refills: 11 | Status: SHIPPED | OUTPATIENT
Start: 2023-02-16 | End: 2024-01-22

## 2023-02-16 ASSESSMENT — PATIENT HEALTH QUESTIONNAIRE - PHQ9
SUM OF ALL RESPONSES TO PHQ QUESTIONS 1-9: 0
10. IF YOU CHECKED OFF ANY PROBLEMS, HOW DIFFICULT HAVE THESE PROBLEMS MADE IT FOR YOU TO DO YOUR WORK, TAKE CARE OF THINGS AT HOME, OR GET ALONG WITH OTHER PEOPLE: NOT DIFFICULT AT ALL
SUM OF ALL RESPONSES TO PHQ QUESTIONS 1-9: 0

## 2023-02-16 NOTE — PATIENT INSTRUCTIONS
If you have any questions regarding your visit, Please contact your care team.     [facility]: [phone]    To Schedule an Appointment 24/7  Call: [phone]     Emiilano Clark MD  Medical Director    Gene Pearson-NORA Palma-Surgery Scheduler  Annabella-Surgery Scheduler               Tuesday-[place]  7:30 a.m-4:30 p.m    Thursday-[place]  7:30 a.m-4:30 p.m    Typical Surgery Days: Tuesday or Friday [facility] [place]  [address] [place] [address]  PH: [phone]     Imaging Scheduling all locations  PH: [phone]     [facility]  [address] [place] [address] [place] [address]  PH: [phone]    [facility]  [address] [place] [address]  PH: [phone] Fax      **Surgeries** Our Surgery Schedulers will contact you to schedule. If you do not receive a call within 3 business days, please call [phone].    Urgent Care locations:  [facility] Monday-Friday  10 am - 8 pm  Saturday and Sunday   9 am - 5 pm  Monday-Friday   10 am - 8 pm  Saturday and Sunday   9 am - 5 pm   [phone]   If you need a medication refill, please contact your pharmacy. Please allow 3 business days for your refill to be completed.  As always, Thank you for trusting us with your healthcare needs!    see additional instructions from your care team below

## 2023-02-16 NOTE — PROGRESS NOTES
Meche is here for a postpartum checkup.  She is s/p bilateral mastectomy with reconstruction.  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    She had a   OB History    Para Term  AB Living   1 1 1 0 0 1   SAB IAB Ectopic Multiple Live Births   0 0 0 0 1      # Outcome Date GA Lbr Wilder/2nd Weight Sex Delivery Anes PTL Lv   1 Term 22 39w1d 09:02 / 03:26 3.16 kg (6 lb 15.5 oz) F Vag-Spont IV, EPI N KEVIN      Name: ASHIA AYERS,JANETT      Apgar1: 8  Apgar5: 9      She has had a normal menses.  She has not had intercourse.  Patient screened for postpartum depression and complaints are NEGATIVE. Screening has also been completed for intimate partner violence. She would like to discuss birth control.      PE: /84 (BP Location: Left arm, Patient Position: Sitting, Cuff Size: Adult Regular)   Pulse 74   Wt 81.1 kg (178 lb 12.8 oz)   LMP 2023 (Approximate)   SpO2 96%   Breastfeeding No   BMI 32.70 kg/m    Body mass index is 32.7 kg/m .    General Appearance:  healthy, alert, active, no distress      A/P  Routine Postpartum     - I discussed the new pap recommendations regarding screening.  Explained the rationale for increased intervals between paps.  Questions asked and answered.  She does agree to this regiment.   - Pap was not performed   - Contraception: OCP      Answers for HPI/ROS submitted by the patient on 2023  If you checked off any problems, how difficult have these problems made it for you to do your work, take care of things at home, or get along with other people?: Not difficult at all  PHQ9 TOTAL SCORE: 0

## 2023-03-02 NOTE — PATIENT INSTRUCTIONS
If you have any questions regarding your visit, Please contact your care team.     [facility] [facility]: [phone]    To Schedule an Appointment 24/7  Call: [phone] HOURS TELEPHONE NUMBER     Emiliano Clark MD  Medical Director    Gene Pearson-NORA Palma-Surgery Scheduler  Annabella-Surgery Scheduler               Tuesday-[place]  7:30 a.m-4:30 p.m    Thursday-[place]  7:30 a.m-4:30 p.m    Typical Surgery Days: Tuesday or Friday [facility] [place]  [address] [place] [address]  PH: [phone]     Imaging Scheduling all locations  PH: [phone]     [facility]  [address] [place] [address] [place] [address]  PH: [phone]    [facility]  [address] [place] [address]  PH: [phone] Fax      **Surgeries** Our Surgery Schedulers will contact you to schedule. If you do not receive a call within 3 business days, please call [phone].    Urgent Care locations:  [facility] Monday-Friday  10 am - 8 pm  Saturday and Sunday   9 am - 5 pm  Monday-Friday   10 am - 8 pm  Saturday and Sunday   9 am - 5 pm   [phone]   If you need a medication refill, please contact your pharmacy. Please allow 3 business days for your refill to be completed.  As always, Thank you for trusting us with your healthcare needs!    see additional instructions from your care team below     Adult

## 2023-03-15 ENCOUNTER — TELEPHONE (OUTPATIENT)
Dept: OBGYN | Facility: CLINIC | Age: 33
End: 2023-03-15

## 2023-03-15 NOTE — TELEPHONE ENCOUNTER
Reason for Call:  Form, our goal is to have forms completed with 72 hours, however, some forms may require a visit or additional information.    Type of letter, form or note:  work     Who is the form from?: Patient    Where did the form come from: Patient or family brought in       What clinic location was the form placed at?: [facility]    Where the form was placed: [employer] Box/Folder    What number is listed as a contact on the form?: [phone]       Additional comments: Return to work - Post Partum     Call taken on 3/15/2023 at 4:49 PM by Duglas Gilmore

## 2023-03-16 ENCOUNTER — TELEPHONE (OUTPATIENT)
Dept: OBGYN | Facility: CLINIC | Age: 33
End: 2023-03-16
Payer: COMMERCIAL

## 2023-03-16 NOTE — TELEPHONE ENCOUNTER
Forms/Letter Request    Type of form/letter: Work    Have you been seen for this request: Yes     Do we have the form/letter: Yes: providers box    When is form/letter needed by: asap    How would you like the form/letter returned:     Patient Notified form requests are processed in 3-5 business days:Yes    [facility] or would you prefer to receive a phone call?:   Patient would prefer a phone call   Okay to leave a detailed message?: Yes at Cell number on file:    Telephone Information:   Mobile [phone]     Yahaira Katz,

## 2023-03-17 NOTE — TELEPHONE ENCOUNTER
Form is in AN and needs an MD signature. Will need to have Josue sign on Tuesday when she's there.  Meghan Johnson, CMA

## 2023-03-21 NOTE — TELEPHONE ENCOUNTER
Please see mychart encounter from 3/20/2023.    Guillermina Worley, [facility]  March 21, 2023

## 2023-06-03 ENCOUNTER — HEALTH MAINTENANCE LETTER (OUTPATIENT)
Age: 33
End: 2023-06-03

## 2023-06-23 ENCOUNTER — MEDICAL CORRESPONDENCE (OUTPATIENT)
Dept: HEALTH INFORMATION MANAGEMENT | Facility: CLINIC | Age: 33
End: 2023-06-23
Payer: COMMERCIAL

## 2023-08-17 ENCOUNTER — OFFICE VISIT (OUTPATIENT)
Dept: SURGERY | Facility: CLINIC | Age: 33
End: 2023-08-17
Payer: COMMERCIAL

## 2023-08-17 VITALS — SYSTOLIC BLOOD PRESSURE: 128 MMHG | OXYGEN SATURATION: 97 % | HEART RATE: 92 BPM | DIASTOLIC BLOOD PRESSURE: 89 MMHG

## 2023-08-17 DIAGNOSIS — Z90.13 S/P BILATERAL MASTECTOMY: Primary | ICD-10-CM

## 2023-08-17 PROCEDURE — 99212 OFFICE O/P EST SF 10 MIN: CPT | Performed by: SURGERY

## 2023-08-17 NOTE — NURSING NOTE
Meche Morrow's goals for this visit include:   Chief Complaint   Patient presents with    RECHECK     6 month follow up     She requests these members of her care team be copied on today's visit information:     PCP: Anai [facility]    Referring Provider:  No referring provider defined for this encounter.    /89 (BP Location: Left arm, Patient Position: Sitting, Cuff Size: Adult Regular)   Pulse 92   SpO2 97%     Do you need any medication refills at today's visit? No  Lidia Azul [facility]

## 2023-08-17 NOTE — LETTER
8/17/2023         RE: Meche Morrow  [address] [address] [address]        Dear Colleague,    Thank you for referring your patient, Meche Morrow, to the [facility]. Please see a copy of my visit note below.    [facility] Follow Up Note    CHIEF COMPLAINT:  History of right breast DCIS    HISTORY OF PRESENT ILLNESS:  Meche Morrow is a 33 year old female who is seen in follow up for right breast DCIS.    Meche underwent bilateral skin sparing mastectomies with right sentinel lymph node biopsy and immediate reconstruction with Dr. Peace on 1/9/2023.  Her pathology revealed extensive ductal carcinoma in situ, grade 3 with focal comedonecrosis and microcalcifications.  The mastectomy margins were all negative and her sentinel lymph nodes were benign.     She reports she has been doing well overall. She had noted breast skin edema and has seen Reema Hoover who educated her on lymphatic massage and discussed options for a compression type bra.      REVIEW OF SYSTEMS:  Constitutional:  Negative for chills, fatigue, fever and weight change.  Eyes:  Negative for blurred vision, eye pain and photophobia.  ENT:  Negative for hearing problems, ENT pain, congestion, rhinorrhea, epistaxis, hoarseness and dental problems.  Cardiovascular:  Negative for chest pain, palpitations, tachycardia, orthopnea and edema.  Respiratory:  Negative for cough, dyspnea and hemoptysis.  Gastrointestinal:  Negative for abdominal pain, heartburn, constipation, diarrhea and stool changes.  Musculoskeletal:  Negative for arthralgias, back pain and myalgias.  Integumentary/Breast:  See HPI.    Past Medical History:   Diagnosis Date     Breast cancer (H)     RIGHT     Elevated blood pressure reading without diagnosis of hypertension     DURING PREGNANCY     Heartburn     DURING PREGNANCY     HSV (herpes simplex virus) infection      Migraine      Uncomplicated  asthma        Past Surgical History:   Procedure Laterality Date     MASTECTOMY SIMPLE BILATERAL, SENTINEL NODE BILATERAL, COMBINED Bilateral 2023    Procedure: bilateral skin sparing mastectomies with right sentinel lymph node biopsy;  Surgeon: Sandra Ulloa MD;  Location: [facility] OR     RECONSTRUCT BREAST BILATERAL, IMPLANT PROSTHESIS BILATERAL, COMBINED Bilateral 2023    Procedure: BILATERAL DIRECT TO IMPLANT PREPECTORAL BREAST RECONSTRUCTION WITH ALLODERM DIRECT TO IMPLANT;  Surgeon: Earnestine Peace MD;  Location: [facility] OR     wisdom teeth extraction         Family History   Problem Relation Age of Onset     No Known Problems Mother      Asthma Father      Asthma Sister      No Known Problems Brother        Social History     Tobacco Use     Smoking status: Former     Types: Cigarettes     Quit date: 2022     Years since quittin.5     Smokeless tobacco: Never   Substance Use Topics     Alcohol use: Not Currently       Patient Active Problem List   Diagnosis     Need for Tdap vaccination     Supervision of normal first pregnancy, antepartum     Breast cancer (H)     Postpartum care and examination     No Known Allergies  Current Outpatient Medications   Medication Sig Dispense Refill     acetaminophen (TYLENOL) 500 MG tablet Take 500-1,000 mg by mouth every 6 hours as needed for mild pain (Patient not taking: Reported on 2023)       albuterol (PROAIR HFA/PROVENTIL HFA/VENTOLIN HFA) 108 (90 Base) MCG/ACT inhaler Inhale 2 puffs into the lungs every 6 hours as needed for shortness of breath, wheezing or cough (TRIGGERED BY COLD WINTER AIR)       methocarbamol (ROBAXIN) 500 MG tablet Take 1 tablet (500 mg) by mouth 4 times daily (Patient not taking: Reported on 2023) 30 tablet 0     norgestimate-ethinyl estradiol (ORTHO-CYCLEN) 0.25-35 MG-MCG tablet Take 1 tablet by mouth daily 28 tablet 11     oxyCODONE (ROXICODONE) 5 MG tablet Take 1 tablet (5 mg) by mouth every 6 hours as needed for pain  (Patient not taking: Reported on 2/2/2023) 25 tablet 0     Prenatal Vit-Fe Fumarate-FA (PRENATAL MULTIVITAMIN W/IRON) 27-0.8 MG tablet Take 1 tablet by mouth daily (Patient not taking: Reported on 2/2/2023)       senna-docusate (SENOKOT-S/PERICOLACE) 8.6-50 MG tablet Take 1 tablet by mouth 2 times daily (Patient not taking: Reported on 2/2/2023) 30 tablet 0     sulfamethoxazole-trimethoprim (BACTRIM DS) 800-160 MG tablet Take 1 tablet by mouth 2 times daily Until all drains are removed (Patient not taking: Reported on 2/2/2023) 32 tablet 0     valACYclovir (VALTREX) 500 MG tablet Take 1 tablet (500 mg) by mouth daily 45 tablet 0     Vitals: There were no vitals taken for this visit.  BMI= There is no height or weight on file to calculate BMI.    EXAM:  GENERAL: healthy, alert and no distress   BREAST: The breasts are surgically absent.  Implants are in place.  Well-healed vertical mastectomy scars.  There are no palpable masses on either chest wall. Mild skin edema bilateral upper inner flaps.   There is no axillary or supraclavicular lymphadenopathy.  CARDIOVASCULAR:  RRR  RESPIRATORY: nonlabored breathing  NECK: Neck supple. No adenopathy. Thyroid symmetric, normal size  SKIN: No suspicious lesions or rashes  LYMPH: Normal cervical lymph nodes      ASSESSMENT/PLAN:  Meche Morrow is now 6 months status post bilateral skin sparing mastectomies with right sentinel lymph node biopsy due to DCIS.  She is recovering well and has no areas of concern on her clinical exam.  She should follow-up in 6 months for chest wall exam and then annually going forward.      Sandra Ulloa MD  [facility]  [phone]        Please route or send letter to:  Primary Care Provider (PCP) and Referring Provider         Again, thank you for allowing me to participate in the care of your patient.        Sincerely,        Sandra Ulloa MD

## 2023-08-17 NOTE — PROGRESS NOTES
[facility] Follow Up Note    CHIEF COMPLAINT:  History of right breast DCIS    HISTORY OF PRESENT ILLNESS:  Meche Morrow is a 33 year old female who is seen in follow up for right breast DCIS.    Meche underwent bilateral skin sparing mastectomies with right sentinel lymph node biopsy and immediate reconstruction with Dr. Peace on 1/9/2023.  Her pathology revealed extensive ductal carcinoma in situ, grade 3 with focal comedonecrosis and microcalcifications.  The mastectomy margins were all negative and her sentinel lymph nodes were benign.     She reports she has been doing well overall. She had noted breast skin edema and has seen Reema Hoover who educated her on lymphatic massage and discussed options for a compression type bra.      REVIEW OF SYSTEMS:  Constitutional:  Negative for chills, fatigue, fever and weight change.  Eyes:  Negative for blurred vision, eye pain and photophobia.  ENT:  Negative for hearing problems, ENT pain, congestion, rhinorrhea, epistaxis, hoarseness and dental problems.  Cardiovascular:  Negative for chest pain, palpitations, tachycardia, orthopnea and edema.  Respiratory:  Negative for cough, dyspnea and hemoptysis.  Gastrointestinal:  Negative for abdominal pain, heartburn, constipation, diarrhea and stool changes.  Musculoskeletal:  Negative for arthralgias, back pain and myalgias.  Integumentary/Breast:  See HPI.    Past Medical History:   Diagnosis Date    Breast cancer (H)     RIGHT    Elevated blood pressure reading without diagnosis of hypertension     DURING PREGNANCY    Heartburn     DURING PREGNANCY    HSV (herpes simplex virus) infection     Migraine     Uncomplicated asthma        Past Surgical History:   Procedure Laterality Date    MASTECTOMY SIMPLE BILATERAL, SENTINEL NODE BILATERAL, COMBINED Bilateral 1/9/2023    Procedure: bilateral skin sparing mastectomies with right sentinel lymph node biopsy;  Surgeon: Sandra Ulloa MD;   Location: [facility]    RECONSTRUCT BREAST BILATERAL, IMPLANT PROSTHESIS BILATERAL, COMBINED Bilateral 2023    Procedure: BILATERAL DIRECT TO IMPLANT PREPECTORAL BREAST RECONSTRUCTION WITH ALLODERM DIRECT TO IMPLANT;  Surgeon: Earnestine Peace MD;  Location: [facility] OR    wisdom teeth extraction         Family History   Problem Relation Age of Onset    No Known Problems Mother     Asthma Father     Asthma Sister     No Known Problems Brother        Social History     Tobacco Use    Smoking status: Former     Types: Cigarettes     Quit date: 2022     Years since quittin.5    Smokeless tobacco: Never   Substance Use Topics    Alcohol use: Not Currently       Patient Active Problem List   Diagnosis    Need for Tdap vaccination    Supervision of normal first pregnancy, antepartum    Breast cancer (H)    Postpartum care and examination     No Known Allergies  Current Outpatient Medications   Medication Sig Dispense Refill    acetaminophen (TYLENOL) 500 MG tablet Take 500-1,000 mg by mouth every 6 hours as needed for mild pain (Patient not taking: Reported on 2023)      albuterol (PROAIR HFA/PROVENTIL HFA/VENTOLIN HFA) 108 (90 Base) MCG/ACT inhaler Inhale 2 puffs into the lungs every 6 hours as needed for shortness of breath, wheezing or cough (TRIGGERED BY COLD WINTER AIR)      methocarbamol (ROBAXIN) 500 MG tablet Take 1 tablet (500 mg) by mouth 4 times daily (Patient not taking: Reported on 2023) 30 tablet 0    norgestimate-ethinyl estradiol (ORTHO-CYCLEN) 0.25-35 MG-MCG tablet Take 1 tablet by mouth daily 28 tablet 11    oxyCODONE (ROXICODONE) 5 MG tablet Take 1 tablet (5 mg) by mouth every 6 hours as needed for pain (Patient not taking: Reported on 2023) 25 tablet 0    Prenatal Vit-Fe Fumarate-FA (PRENATAL MULTIVITAMIN W/IRON) 27-0.8 MG tablet Take 1 tablet by mouth daily (Patient not taking: Reported on 2023)      senna-docusate (SENOKOT-S/PERICOLACE) 8.6-50 MG tablet Take 1 tablet by mouth 2 times  daily (Patient not taking: Reported on 2/2/2023) 30 tablet 0    sulfamethoxazole-trimethoprim (BACTRIM DS) 800-160 MG tablet Take 1 tablet by mouth 2 times daily Until all drains are removed (Patient not taking: Reported on 2/2/2023) 32 tablet 0    valACYclovir (VALTREX) 500 MG tablet Take 1 tablet (500 mg) by mouth daily 45 tablet 0     Vitals: There were no vitals taken for this visit.  BMI= There is no height or weight on file to calculate BMI.    EXAM:  GENERAL: healthy, alert and no distress   BREAST: The breasts are surgically absent.  Implants are in place.  Well-healed vertical mastectomy scars.  There are no palpable masses on either chest wall. Mild skin edema bilateral upper inner flaps.   There is no axillary or supraclavicular lymphadenopathy.  CARDIOVASCULAR:  RRR  RESPIRATORY: nonlabored breathing  NECK: Neck supple. No adenopathy. Thyroid symmetric, normal size  SKIN: No suspicious lesions or rashes  LYMPH: Normal cervical lymph nodes      ASSESSMENT/PLAN:  Meche Morrow is now 6 months status post bilateral skin sparing mastectomies with right sentinel lymph node biopsy due to DCIS.  She is recovering well and has no areas of concern on her clinical exam.  She should follow-up in 6 months for chest wall exam and then annually going forward.      Sandra Ulloa MD  [facility]  [phone]        Please route or send letter to:  Primary Care Provider (PCP) and Referring Provider

## 2023-10-13 ENCOUNTER — OFFICE VISIT (OUTPATIENT)
Dept: FAMILY MEDICINE | Facility: CLINIC | Age: 33
End: 2023-10-13
Payer: COMMERCIAL

## 2023-10-13 VITALS
TEMPERATURE: 99.2 F | WEIGHT: 162.2 LBS | OXYGEN SATURATION: 95 % | SYSTOLIC BLOOD PRESSURE: 131 MMHG | HEIGHT: 61 IN | HEART RATE: 82 BPM | RESPIRATION RATE: 16 BRPM | DIASTOLIC BLOOD PRESSURE: 74 MMHG | BODY MASS INDEX: 30.62 KG/M2

## 2023-10-13 DIAGNOSIS — J45.20 MILD INTERMITTENT ASTHMA WITHOUT COMPLICATION: Primary | ICD-10-CM

## 2023-10-13 PROCEDURE — 99214 OFFICE O/P EST MOD 30 MIN: CPT | Performed by: PHYSICIAN ASSISTANT

## 2023-10-13 RX ORDER — ALBUTEROL SULFATE 90 UG/1
2 AEROSOL, METERED RESPIRATORY (INHALATION) EVERY 6 HOURS PRN
Qty: 18 G | Refills: 11 | Status: SHIPPED | OUTPATIENT
Start: 2023-10-13

## 2023-10-13 ASSESSMENT — PAIN SCALES - GENERAL: PAINLEVEL: NO PAIN (0)

## 2023-10-13 NOTE — PROGRESS NOTES
"  Assessment & Plan     (J45.20) Mild intermittent asthma without complication  (primary encounter diagnosis)  Comment: History of mild intermittent asthma.  This has been more prominent as of late.  Cold air triggers symptoms.  Discussed with patient adding Flovent to her regimen.  We will recheck in 1 to 3 months as well as discuss annual physical at that time.  Should she have any worsening severe shortness of breath or new concerns seek more emergent evaluation.  No wheezing on exam here today.  Refills of albuterol provided.  Plan: albuterol (PROAIR HFA/PROVENTIL HFA/VENTOLIN         HFA) 108 (90 Base) MCG/ACT inhaler, fluticasone        (FLOVENT DISKUS) 100 MCG/ACT inhaler                     BMI:   Estimated body mass index is 30.65 kg/m  as calculated from the following:    Height as of this encounter: 1.549 m (5' 1\").    Weight as of this encounter: 73.6 kg (162 lb 3.2 oz).       Titus Chacon PA-C  [facility]   Meche is a 33 year old, presenting for the following health issues:  Asthma    History of Present Illness       Reason for visit:  Asthma    She eats 2-3 servings of fruits and vegetables daily.She consumes 2 sweetened beverage(s) daily.She exercises with enough effort to increase her heart rate 10 to 19 minutes per day.  She exercises with enough effort to increase her heart rate 3 or less days per week.   She is taking medications regularly.     Patient with multiple years of asthma.  Uses albuterol inhaler.  Triggers include cold air and normally uses albuterol more regularly in the fall and winter months.  Patient states that she uses albuterol up to 3 times a day.  Wakes multiple times in the course of the week needing to use albuterol.  Has never been on any controller medication previously.      No recent URI symptoms.  No cough.    Review of Systems   Constitutional, HEENT, cardiovascular, pulmonary, gi and gu systems are negative, except as " "otherwise noted.      Objective    /74   Pulse 82   Temp 99.2  F (37.3  C) (Tympanic)   Resp 16   Ht 1.549 m (5' 1\")   Wt 73.6 kg (162 lb 3.2 oz)   SpO2 95%   BMI 30.65 kg/m    Body mass index is 30.65 kg/m .  Physical Exam   GENERAL: healthy, alert and no distress  NECK: no adenopathy, no asymmetry, masses, or scars and thyroid normal to palpation  RESP: lungs clear to auscultation - no rales, rhonchi or wheezes  CV: regular rate and rhythm, normal S1 S2, no S3 or S4, no murmur, click or rub, no peripheral edema and peripheral pulses strong  MS: no gross musculoskeletal defects noted, no edema                      Answers submitted by the patient for this visit:  General Questionnaire (Submitted on 10/13/2023)  Chief Complaint: Chronic problems general questions HPI Form  What is the reason for your visit today? : asthma  How many servings of fruits and vegetables do you eat daily?: 2-3  On average, how many sweetened beverages do you drink each day (Examples: soda, juice, sweet tea, etc.  Do NOT count diet or artificially sweetened beverages)?: 2  How many minutes a day do you exercise enough to make your heart beat faster?: 10 to 19  How many days a week do you exercise enough to make your heart beat faster?: 3 or less  How many days per week do you miss taking your medication?: 0    "

## 2023-10-13 NOTE — LETTER
My Asthma Action Plan    Name: Meche Morrow   YOB: 1990  Date: 10/13/2023   My doctor: Titus Chacon PA-C   My clinic: [facility]        My Rescue Medicine:   Albuterol inhaler (Proair/Ventolin/Proventil HFA)  2-4 puffs EVERY 4 HOURS as needed. Use a spacer if recommended by your provider.    Trial of Fluticasone twice daily for the next 1-3 months.  My Asthma Severity:   Mild Persistent  Know your asthma triggers: cold air             GREEN ZONE   Good Control  I feel good  No cough or wheeze  Can work, sleep and play without asthma symptoms       Take your asthma control medicine every day.     If exercise triggers your asthma, take your rescue medication  15 minutes before exercise or sports, and  During exercise if you have asthma symptoms  Spacer to use with inhaler: If you have a spacer, make sure to use it with your inhaler             YELLOW ZONE Getting Worse  I have ANY of these:  I do not feel good  Cough or wheeze  Chest feels tight  Wake up at night   Keep taking your Green Zone medications  Start taking your rescue medicine:  every 20 minutes for up to 1 hour. Then every 4 hours for 24-48 hours.  If you stay in the Yellow Zone for more than 12-24 hours, contact your doctor.  If you do not return to the Green Zone in 12-24 hours or you get worse, start taking your oral steroid medicine if prescribed by your provider.           RED ZONE Medical Alert - Get Help  I have ANY of these:  I feel awful  Medicine is not helping  Breathing getting harder  Trouble walking or talking  Nose opens wide to breathe       Take your rescue medicine NOW  If your provider has prescribed an oral steroid medicine, start taking it NOW  Call your doctor NOW  If you are still in the Red Zone after 20 minutes and you have not reached your doctor:  Take your rescue medicine again and  Call 911 or go to the emergency room right away    See your regular doctor within 2 weeks of  an Emergency Room or Urgent Care visit for follow-up treatment.          Annual Reminders:  Meet with Asthma Educator,  Flu Shot in the Fall, consider Pneumonia Vaccination for patients with asthma (aged 19 and older).    Pharmacy: [id number] - [place] - [address]    Electronically signed by Titus Chacon PA-C   Date: 10/13/23                    Asthma Triggers  How To Control Things That Make Your Asthma Worse    Triggers are things that make your asthma worse.  Look at the list below to help you find your triggers and   what you can do about them. You can help prevent asthma flare-ups by staying away from your triggers.      Trigger                                                          What you can do   Cigarette Smoke  Tobacco smoke can make asthma worse. Do not allow smoking in your home, car or around you.  Be sure no one smokes at a child s day care or school.  If you smoke, ask your health care provider for ways to help you quit.  Ask family members to quit too.  Ask your health care provider for a referral to Quit Plan to help you quit smoking, or call 5-875-037PLAN.     Colds, Flu, Bronchitis  These are common triggers of asthma. Wash your hands often.  Don t touch your eyes, nose or mouth.  Get a flu shot every year.     Dust Mites  These are tiny bugs that live in cloth or carpet. They are too small to see. Wash sheets and blankets in hot water every week.   Encase pillows and mattress in dust mite proof covers.  Avoid having carpet if you can. If you have carpet, vacuum weekly.   Use a dust mask and HEPA vacuum.   Pollen and Outdoor Mold  Some people are allergic to trees, grass, or weed pollen, or molds. Try to keep your windows closed.  Limit time out doors when pollen count is high.   Ask you health care provider about taking medicine during allergy season.     Animal Dander  Some people are allergic to skin flakes, urine or saliva from pets with fur or  feathers. Keep pets with fur or feathers out of your home.    If you can t keep the pet outdoors, then keep the pet out of your bedroom.  Keep the bedroom door closed.  Keep pets off cloth furniture and away from stuffed toys.     Mice, Rats, and Cockroaches  Some people are allergic to the waste from these pests.   Cover food and garbage.  Clean up spills and food crumbs.  Store grease in the refrigerator.   Keep food out of the bedroom.   Indoor Mold  This can be a trigger if your home has high moisture. Fix leaking faucets, pipes, or other sources of water.   Clean moldy surfaces.  Dehumidify basement if it is damp and smelly.   Smoke, Strong Odors, and Sprays  These can reduce air quality. Stay away from strong odors and sprays, such as perfume, powder, hair spray, paints, smoke incense, paint, cleaning products, candles and new carpet.   Exercise or Sports  Some people with asthma have this trigger. Be active!  Ask your doctor about taking medicine before sports or exercise to prevent symptoms.    Warm up for 5-10 minutes before and after sports or exercise.     Other Triggers of Asthma  Cold air:  Cover your nose and mouth with a scarf.  Sometimes laughing or crying can be a trigger.  Some medicines and food can trigger asthma.

## 2023-12-06 ENCOUNTER — NURSE TRIAGE (OUTPATIENT)
Dept: FAMILY MEDICINE | Facility: CLINIC | Age: 33
End: 2023-12-06

## 2023-12-06 ENCOUNTER — OFFICE VISIT (OUTPATIENT)
Dept: FAMILY MEDICINE | Facility: CLINIC | Age: 33
End: 2023-12-06
Payer: COMMERCIAL

## 2023-12-06 VITALS
BODY MASS INDEX: 31.39 KG/M2 | DIASTOLIC BLOOD PRESSURE: 77 MMHG | TEMPERATURE: 98.2 F | RESPIRATION RATE: 16 BRPM | OXYGEN SATURATION: 98 % | HEART RATE: 75 BPM | SYSTOLIC BLOOD PRESSURE: 134 MMHG | WEIGHT: 170.6 LBS | HEIGHT: 62 IN

## 2023-12-06 DIAGNOSIS — N89.8 VAGINAL DISCHARGE: ICD-10-CM

## 2023-12-06 DIAGNOSIS — N94.9 VAGINAL DISCOMFORT: Primary | ICD-10-CM

## 2023-12-06 LAB
ALBUMIN UR-MCNC: NEGATIVE MG/DL
APPEARANCE UR: CLEAR
BILIRUB UR QL STRIP: NEGATIVE
CLUE CELLS: ABNORMAL
COLOR UR AUTO: YELLOW
GLUCOSE UR STRIP-MCNC: NEGATIVE MG/DL
HCG UR QL: NEGATIVE
HGB UR QL STRIP: NEGATIVE
KETONES UR STRIP-MCNC: NEGATIVE MG/DL
LEUKOCYTE ESTERASE UR QL STRIP: NEGATIVE
NITRATE UR QL: NEGATIVE
PH UR STRIP: 5.5 [PH] (ref 5–7)
SP GR UR STRIP: >=1.03 (ref 1–1.03)
TRICHOMONAS, WET PREP: ABNORMAL
UROBILINOGEN UR STRIP-ACNC: 0.2 E.U./DL
WBC'S/HIGH POWER FIELD, WET PREP: ABNORMAL
YEAST, WET PREP: ABNORMAL

## 2023-12-06 PROCEDURE — 99213 OFFICE O/P EST LOW 20 MIN: CPT | Performed by: PHYSICIAN ASSISTANT

## 2023-12-06 PROCEDURE — 81025 URINE PREGNANCY TEST: CPT | Performed by: PHYSICIAN ASSISTANT

## 2023-12-06 PROCEDURE — 87210 SMEAR WET MOUNT SALINE/INK: CPT | Performed by: PHYSICIAN ASSISTANT

## 2023-12-06 PROCEDURE — 81003 URINALYSIS AUTO W/O SCOPE: CPT | Performed by: PHYSICIAN ASSISTANT

## 2023-12-06 ASSESSMENT — PAIN SCALES - GENERAL: PAINLEVEL: NO PAIN (0)

## 2023-12-06 ASSESSMENT — ASTHMA QUESTIONNAIRES: ACT_TOTALSCORE: 18

## 2023-12-06 NOTE — TELEPHONE ENCOUNTER
"Patient calling, stated she was transferred after making appointment for today, and was to go over symptoms she is having since a week ago. No pain, just light burning vaginally rated a 1 out of 10, more so an irritant she states. Stated she is not sure if she has yeast infection.  She has vaginal odor, light abdominal pain as if she's wearing \"too tight of pants, and burning sensation vaginally that comes and goes. No itching, light yellow discharge if any.   Tried OTC yeast infection med, felt like it was working. Feels more dry than anything.   Patient states she had the same symptoms last year when she found out she was pregnant and hopes she is not pregnant as she \"has not been the best with her birth control\".     Disposition states to be seen within 3 days. Patient already has appointment for today at 4:30pm, 12/6 regarding symptoms. Patient verbalized understanding, and had no further questions or concerns.    Celso Thayer RN  [facility]      Reason for Disposition   Vaginal odor (bad smell) not improved > 3 days following Care Advice    Additional Information   Negative: Sounds like a life-threatening emergency to the triager   Negative: Followed a genital area injury (e.g., vagina, vulva)   Negative: Vaginal bleeding is main symptom   Negative: Vaginal discharge is main symptom   Negative: Pain or burning with passing urine (urination) is main symptom   Negative: Menstrual cramps is main symptom   Negative: Abdomen pain is main symptom   Negative: Pubic lice suspected   Negative: Itching or rash of female genital area (e.g., labia, vagina, vulva)   Negative: Pregnant and labor suspected   Negative: Patient sounds very sick or weak to the triager   Negative: SEVERE pain and not improved 2 hours after pain medicine   Negative: Genital area looks infected (e.g., draining sore, spreading redness) and fever   Negative: Something is hanging out of the vagina and can't easily be pushed " "back inside   Negative: MILD-MODERATE pain and present > 24 hours  (Exception: Chronic pain.)   Negative: Genital area looks infected (e.g., draining sore, spreading redness)   Negative: Rash with painful tiny water blisters   Negative: MODERATE-SEVERE itching (i.e., interferes with school, work, or sleep)   Negative: Rash (e.g., redness, tiny bumps, sore) of genital area and present > 24 hours   Negative: Tender lump (swelling or \"ball\") at vaginal opening   Negative: Symptoms of a yeast infection (i.e., itchy, white discharge, not bad smelling) and not improved > 3 days following Care Advice   Negative: Vaginal itching and not improved > 3 days following Care Advice    Answer Assessment - Initial Assessment Questions  1. SYMPTOM: \"What's the main symptom you're concerned about?\" (e.g., pain, itching, dryness)       More so on the lines of complete discomfort \"down there\"  2. LOCATION: \"Where is the  Vaginal  burning located?\" (e.g., inside/outside, left/right)      More inside  3. ONSET: \"When did the  symptoms  start?\"      About a week or so ago.   4. PAIN: \"Is there any pain?\" If Yes, ask: \"How bad is it?\" (Scale: 1-10; mild, moderate, severe)    -  MILD (1-3): Doesn't interfere with normal activities.     -  MODERATE (4-7): Interferes with normal activities (e.g., work or school) or awakens from sleep.      -  SEVERE (8-10): Excruciating pain, unable to do any normal activities.      1 - more burning and irritating and \"it's there\"  5. ITCHING: \"Is there any itching?\" If Yes, ask: \"How bad is it?\" (Scale: 1-10; mild, moderate, severe)      No itching  6. CAUSE: \"What do you think is causing the discharge?\" \"Have you had the same problem before? What happened then?\"      Yeast potentially, slight yellowish discharge if any.   7. OTHER SYMPTOMS: \"Do you have any other symptoms?\" (e.g., fever, itching, vaginal bleeding, pain with urination, injury to genital area, vaginal foreign body)      No  8. PREGNANCY: \"Is " "there any chance you are pregnant?\" \"When was your last menstrual period?\"      \"Does not know exact date of when it was, hopes she not pregnant since she' hasn't been the greatest with birth control, states she had the same symptoms last year when you were pregnant. Ended up being vaginitis.\"    Protocols used: Vaginal Symptoms-A-OH    "

## 2023-12-06 NOTE — PROGRESS NOTES
"  Assessment & Plan     (N94.9) Vaginal discomfort  (primary encounter diagnosis)  Comment: Vaginal/pelvic discomfort.  Patient describes this is more of a fullness sensation.  No vaginal bleeding.  As for vaginal odor that she has been experiencing.  Negative wet prep, pregnancy test, urinalysis.  Patient declines pelvic examination here today.  Discussed with patient could not rule out other etiologies of her symptoms without this.  Patient states she had similar episode last year that resolved without intervention.  She will continue to monitor symptoms.  If she does not have improvement she will seek repeat evaluation and possible pelvic examination at that time.  Patient declined STI testing here.  Plan: UA Macroscopic with reflex to Microscopic and         Culture - Lab Collect, Wet prep - lab collect,         HCG qualitative urine          (N89.8) Vaginal discharge  Comment:   Plan:              BMI:   Estimated body mass index is 31.2 kg/m  as calculated from the following:    Height as of this encounter: 1.575 m (5' 2\").    Weight as of this encounter: 77.4 kg (170 lb 9.6 oz).           Titus Chacon PA-C  [facility]   Meche is a 33 year old, presenting for the following health issues:  Vaginal Discharge, Vaginal discomfort, Abdominal Pain, and Frequency    History of Present Illness       Reason for visit:  Vaginal discomfort    She eats 2-3 servings of fruits and vegetables daily.She consumes 1 sweetened beverage(s) daily.She exercises with enough effort to increase her heart rate 9 or less minutes per day.  She exercises with enough effort to increase her heart rate 3 or less days per week.   She is taking medications regularly.     For roughly 1 week patient has had some malodorous vaginal discharge/discomfort.  Has missed multiple doses of birth control medication.  Denies any vaginal bleeding.  Last menstrual period 1 month ago and that was regular for her.  " "No new sexual partners or STI concern.  Patient declines STI testing here.  No nausea, vomiting, diarrhea.  Denies any dysuria or hematuria.  No significant vaginal discharge.  States symptoms started 1 morning after urination as though she was having bladder spasms.  No back pain.         Review of Systems   Constitutional, HEENT, cardiovascular, pulmonary, gi and gu systems are negative, except as otherwise noted.      Objective    /77   Pulse 75   Temp 98.2  F (36.8  C) (Tympanic)   Resp 16   Ht 1.575 m (5' 2\")   Wt 77.4 kg (170 lb 9.6 oz)   SpO2 98%   BMI 31.20 kg/m    Body mass index is 31.2 kg/m .  Physical Exam   GENERAL: healthy, alert and no distress  RESP: lungs clear to auscultation - no rales, rhonchi or wheezes  CV: regular rate and rhythm, normal S1 S2, no S3 or S4, no murmur, click or rub, no peripheral edema and peripheral pulses strong  ABDOMEN: soft, nontender, no hepatosplenomegaly, no masses and bowel sounds normal  MS: no gross musculoskeletal defects noted, no edema                      "

## 2024-01-22 ENCOUNTER — MYC MEDICAL ADVICE (OUTPATIENT)
Dept: OBGYN | Facility: CLINIC | Age: 34
End: 2024-01-22
Payer: COMMERCIAL

## 2024-01-22 DIAGNOSIS — Z30.011 ENCOUNTER FOR INITIAL PRESCRIPTION OF CONTRACEPTIVE PILLS: ICD-10-CM

## 2024-01-22 NOTE — TELEPHONE ENCOUNTER
Requested Prescriptions   Pending Prescriptions Disp Refills    norgestimate-ethinyl estradiol (ORTHO-CYCLEN) 0.25-35 MG-MCG tablet 28 tablet 11     Sig: Take 1 tablet by mouth daily       Contraceptives Protocol Failed - 1/22/2024  8:38 AM        Failed - Recent (12 mo) or future (30 days) visit within the authorizing provider's specialty     The patient must have completed an in-person or virtual visit within the past 12 months or has a future visit scheduled within the next 90 days with the authorizing provider s specialty.  Urgent care and e-visits do not quality as an office visit for this protocol.          Passed - Patient is not a current smoker if age is 35 or older        Passed - Medication is active on med list        Passed - No active pregnancy on record        Passed - No positive pregnancy test in past 12 months           Pt last seen 2/16/2023 for PP    Last prescribed 2/16/2023 for 28 tablets with 11 refills    RN sent [employer] message to pt to schedule appt for annual    Dorothea Glez RN on 1/22/2024 at 8:47 AM

## 2024-01-25 RX ORDER — NORGESTIMATE AND ETHINYL ESTRADIOL 0.25-0.035
1 KIT ORAL DAILY
Qty: 28 TABLET | Refills: 2 | Status: SHIPPED | OUTPATIENT
Start: 2024-01-25 | End: 2024-03-14

## 2024-01-25 NOTE — TELEPHONE ENCOUNTER
Pt has appt on 3/14/2024 with Dr. Clark- RN sent di refill to get pt to this appt.    Dorothea Glez RN on 1/25/2024 at 8:54 AM

## 2024-02-22 ENCOUNTER — PATIENT OUTREACH (OUTPATIENT)
Dept: ONCOLOGY | Facility: CLINIC | Age: 34
End: 2024-02-22

## 2024-02-22 ENCOUNTER — OFFICE VISIT (OUTPATIENT)
Dept: SURGERY | Facility: CLINIC | Age: 34
End: 2024-02-22
Payer: COMMERCIAL

## 2024-02-22 DIAGNOSIS — D05.11 DUCTAL CARCINOMA IN SITU (DCIS) OF RIGHT BREAST: Primary | ICD-10-CM

## 2024-02-22 PROCEDURE — 99212 OFFICE O/P EST SF 10 MIN: CPT | Performed by: SURGERY

## 2024-02-22 NOTE — PROGRESS NOTES
[facility] Follow Up Note    CHIEF COMPLAINT:  History of right breast DCIS    HISTORY OF PRESENT ILLNESS:  Meche Morrow is a 33 year old female who is seen in follow up for  right breast DCIS.     Meche underwent bilateral skin sparing mastectomies with right sentinel lymph node biopsy and immediate reconstruction with Dr. Peace on 1/9/2023.  Her pathology revealed extensive ductal carcinoma in situ, grade 3 with focal comedonecrosis and microcalcifications.  The mastectomy margins were all negative and her sentinel lymph nodes were benign.     She is here for 1 year follow-up.  She reports she has overall been doing very well.  Her daughter is now 18 months old.  She did notice yesterday when lying on the ground playing with her daughter some tightness/pain when reaching laterally on her left side.  She deals with phantom nipple pain occasionally and noticed this more at work than any other time.  It is not bothering her to the point of needing or being interested in any medication options.        Past Medical History:   Diagnosis Date    Breast cancer (H)     RIGHT    Elevated blood pressure reading without diagnosis of hypertension     DURING PREGNANCY    Heartburn     DURING PREGNANCY    HSV (herpes simplex virus) infection     Migraine     Uncomplicated asthma        Past Surgical History:   Procedure Laterality Date    MASTECTOMY SIMPLE BILATERAL, SENTINEL NODE BILATERAL, COMBINED Bilateral 1/9/2023    Procedure: bilateral skin sparing mastectomies with right sentinel lymph node biopsy;  Surgeon: Sandra Ulloa MD;  Location: [facility] OR    RECONSTRUCT BREAST BILATERAL, IMPLANT PROSTHESIS BILATERAL, COMBINED Bilateral 1/9/2023    Procedure: BILATERAL DIRECT TO IMPLANT PREPECTORAL BREAST RECONSTRUCTION WITH ALLODERM DIRECT TO IMPLANT;  Surgeon: Earnestine Peace MD;  Location: [facility] OR    wisdom teeth extraction         Family History   Problem Relation Age of Onset    No Known  Problems Mother     Asthma Father     Asthma Sister     No Known Problems Brother        Social History     Tobacco Use    Smoking status: Former     Types: Cigarettes     Quit date: 2022     Years since quittin.0    Smokeless tobacco: Never   Substance Use Topics    Alcohol use: Not Currently       Patient Active Problem List   Diagnosis    Need for Tdap vaccination    Supervision of normal first pregnancy, antepartum    Breast cancer (H)    Postpartum care and examination     No Known Allergies  Current Outpatient Medications   Medication Sig Dispense Refill    albuterol (PROAIR HFA/PROVENTIL HFA/VENTOLIN HFA) 108 (90 Base) MCG/ACT inhaler Inhale 2 puffs into the lungs every 6 hours as needed for shortness of breath, wheezing or cough (TRIGGERED BY COLD WINTER AIR) 18 g 11    fluticasone (FLOVENT DISKUS) 100 MCG/ACT inhaler Inhale 1 puff into the lungs every 12 hours for 30 days 60 each 3    norgestimate-ethinyl estradiol (ORTHO-CYCLEN) 0.25-35 MG-MCG tablet Take 1 tablet by mouth daily 28 tablet 2    Prenatal Vit-Fe Fumarate-FA (PRENATAL MULTIVITAMIN W/IRON) 27-0.8 MG tablet Take 1 tablet by mouth daily (Patient not taking: Reported on 2023)      valACYclovir (VALTREX) 500 MG tablet Take 1 tablet (500 mg) by mouth daily (Patient not taking: Reported on 2023) 45 tablet 0     Vitals: There were no vitals taken for this visit.  BMI= There is no height or weight on file to calculate BMI.    EXAM:  GENERAL: healthy, alert and no distress   BREAST: Breasts are surgically absent.  Implants are in place.  Well-healed vertical mastectomy scars.  No obvious capsular contracture.  There are no masses palpable on either chest wall.  Mild tenderness to palpation on the left lateral chest wall but no underlying bulk.  There is no axillary or supraclavicular lymphadenopathy.  CARDIOVASCULAR:  RRR  RESPIRATORY: nonlabored breathing  NECK: Neck supple. No adenopathy. Thyroid symmetric, normal size  SKIN: No  suspicious lesions or rashes  LYMPH: Normal cervical lymph nodes      ASSESSMENT/PLAN:  Meche Morrow is now 1 year status post bilateral skin sparing mastectomies with right sentinel lymph node biopsy due to extensive DCIS.  Her clinical breast exam is benign.  Going forward she should have an annual chest wall exam either with myself or her OB/GYN/PCP.  She is seeing Dr. Clark soon and will discuss with him doing her chest exam at her annual visits.  She will see me as needed in the future.          Sandra Ulloa MD  [facility]  [phone]        Please route or send letter to:  Primary Care Provider (PCP) and Referring Provider

## 2024-02-22 NOTE — PROGRESS NOTES
Writer received referral to Cancer Risk Management/Genetic Counseling.    Referred for:     please schedule with genetics. pt has h/o right DCIS and family history        Referral reviewed for appropriate plan, and sent to New Patient Scheduling ([phone]) for completion.    Rachel Dumont, RN, BSN  Oncology New Patient Nurse Navigator   [facility]  [phone]

## 2024-02-22 NOTE — NURSING NOTE
Meche Morrow's goals for this visit include:   Chief Complaint   Patient presents with    RECHECK     6 month chest wall exam, tightness of skin under left arm       She requests these members of her care team be copied on today's visit information: no    PCP: Anai Luong [facility]    Referring Provider:  No referring provider defined for this encounter.    There were no vitals taken for this visit.    Do you need any medication refills at today's visit? No    Tomeka Dc LPN

## 2024-02-22 NOTE — LETTER
2/22/2024         RE: Meche Morrow  [address] [facility] [address]        Dear Colleague,    Thank you for referring your patient, Meche Morrow, to the [facility]. Please see a copy of my visit note below.    [facility] Follow Up Note    CHIEF COMPLAINT:  History of right breast DCIS    HISTORY OF PRESENT ILLNESS:  Meche Morrow is a 33 year old female who is seen in follow up for  right breast DCIS.     Meche underwent bilateral skin sparing mastectomies with right sentinel lymph node biopsy and immediate reconstruction with Dr. Peace on 1/9/2023.  Her pathology revealed extensive ductal carcinoma in situ, grade 3 with focal comedonecrosis and microcalcifications.  The mastectomy margins were all negative and her sentinel lymph nodes were benign.     She is here for 1 year follow-up.  She reports she has overall been doing very well.  Her daughter is now 18 months old.  She did notice yesterday when lying on the ground playing with her daughter some tightness/pain when reaching laterally on her left side.  She deals with phantom nipple pain occasionally and noticed this more at work than any other time.  It is not bothering her to the point of needing or being interested in any medication options.        Past Medical History:   Diagnosis Date     Breast cancer (H)     RIGHT     Elevated blood pressure reading without diagnosis of hypertension     DURING PREGNANCY     Heartburn     DURING PREGNANCY     HSV (herpes simplex virus) infection      Migraine      Uncomplicated asthma        Past Surgical History:   Procedure Laterality Date     MASTECTOMY SIMPLE BILATERAL, SENTINEL NODE BILATERAL, COMBINED Bilateral 1/9/2023    Procedure: bilateral skin sparing mastectomies with right sentinel lymph node biopsy;  Surgeon: Sandra Ulloa MD;  Location: [facility]     RECONSTRUCT BREAST BILATERAL, IMPLANT PROSTHESIS BILATERAL,  COMBINED Bilateral 2023    Procedure: BILATERAL DIRECT TO IMPLANT PREPECTORAL BREAST RECONSTRUCTION WITH ALLODERM DIRECT TO IMPLANT;  Surgeon: Earnestine Peace MD;  Location: [facility] OR     wisdom teeth extraction         Family History   Problem Relation Age of Onset     No Known Problems Mother      Asthma Father      Asthma Sister      No Known Problems Brother        Social History     Tobacco Use     Smoking status: Former     Types: Cigarettes     Quit date: 2022     Years since quittin.0     Smokeless tobacco: Never   Substance Use Topics     Alcohol use: Not Currently       Patient Active Problem List   Diagnosis     Need for Tdap vaccination     Supervision of normal first pregnancy, antepartum     Breast cancer (H)     Postpartum care and examination     No Known Allergies  Current Outpatient Medications   Medication Sig Dispense Refill     albuterol (PROAIR HFA/PROVENTIL HFA/VENTOLIN HFA) 108 (90 Base) MCG/ACT inhaler Inhale 2 puffs into the lungs every 6 hours as needed for shortness of breath, wheezing or cough (TRIGGERED BY COLD WINTER AIR) 18 g 11     fluticasone (FLOVENT DISKUS) 100 MCG/ACT inhaler Inhale 1 puff into the lungs every 12 hours for 30 days 60 each 3     norgestimate-ethinyl estradiol (ORTHO-CYCLEN) 0.25-35 MG-MCG tablet Take 1 tablet by mouth daily 28 tablet 2     Prenatal Vit-Fe Fumarate-FA (PRENATAL MULTIVITAMIN W/IRON) 27-0.8 MG tablet Take 1 tablet by mouth daily (Patient not taking: Reported on 2023)       valACYclovir (VALTREX) 500 MG tablet Take 1 tablet (500 mg) by mouth daily (Patient not taking: Reported on 2023) 45 tablet 0     Vitals: There were no vitals taken for this visit.  BMI= There is no height or weight on file to calculate BMI.    EXAM:  GENERAL: healthy, alert and no distress   BREAST: Breasts are surgically absent.  Implants are in place.  Well-healed vertical mastectomy scars.  No obvious capsular contracture.  There are no masses palpable on  either chest wall.  Mild tenderness to palpation on the left lateral chest wall but no underlying bulk.  There is no axillary or supraclavicular lymphadenopathy.  CARDIOVASCULAR:  RRR  RESPIRATORY: nonlabored breathing  NECK: Neck supple. No adenopathy. Thyroid symmetric, normal size  SKIN: No suspicious lesions or rashes  LYMPH: Normal cervical lymph nodes      ASSESSMENT/PLAN:  Meche Morrow is now 1 year status post bilateral skin sparing mastectomies with right sentinel lymph node biopsy due to extensive DCIS.  Her clinical breast exam is benign.  Going forward she should have an annual chest wall exam either with myself or her OB/GYN/PCP.  She is seeing Dr. Clark soon and will discuss with him doing her chest exam at her annual visits.  She will see me as needed in the future.          Sandra Ulloa MD  [facility]  [phone]        Please route or send letter to:  Primary Care Provider (PCP) and Referring Provider         Again, thank you for allowing me to participate in the care of your patient.        Sincerely,        Sandra Ulloa MD

## 2024-02-23 ENCOUNTER — TELEPHONE (OUTPATIENT)
Dept: SURGERY | Facility: CLINIC | Age: 34
End: 2024-02-23
Payer: COMMERCIAL

## 2024-02-23 NOTE — TELEPHONE ENCOUNTER
Left Voicemail (1st Attempt) for the patient to call back and schedule the following:    Appointment type: return  Provider: dr. valladares  Return date: 2/22/2025  Specialty phone number: [phone]   Additonal Notes:Return in about 1 year (around 2/22/2025) for Exam     [facility]   Orthopedics, Podiatry, Sports Medicine, Ent ,Eye , Audiology, Adult Endocrine & Diabetes, Nutrition & Medication Therapy Management Specialties   [facility]

## 2024-03-11 NOTE — PATIENT INSTRUCTIONS
If you have any questions regarding your visit, Please contact your care team.     [employer] [facility]: [phone]    To Schedule an Appointment 24/7  Call: [phone]     Emiliano Clark MD  Medical Director        Dorothea-NORA Pearson-RN  Betty Palma-Surgery Scheduler  Annabella-Surgery Scheduler               Tuesday-[place]  7:30 a.m-4:30 p.m    Thursday-[place]  7:30 a.m-4:30 p.m    Typical Surgery Days: Tuesday or Friday [facility] [address]  [address] [place] [address]  PH: [phone]    Imaging Scheduling all locations  PH: [phone]     [facility]  [address] [place] [address] [place] [address]  PH: [phone]    [facility]  [address] [place] [address]  PH: [phone] Fax      **Surgeries** Our Surgery Schedulers will contact you to schedule. If you do not receive a call within 3 business days, please call [phone].    Urgent Care locations:  [facility] Monday-Friday  10 am - 8 pm  Saturday and Sunday   9 am - 5 pm  Monday-Friday   10 am - 8 pm  Saturday and Sunday   9 am - 5 pm   [phone]   If you need a medication refill, please contact your pharmacy. Please allow 3 business days for your refill to be completed.  As always, Thank you for trusting us with your healthcare needs!    see additional instructions from your care team below     Pat doesn't think he can administer insulin reliably.

## 2024-03-14 ENCOUNTER — OFFICE VISIT (OUTPATIENT)
Dept: OBGYN | Facility: CLINIC | Age: 34
End: 2024-03-14
Payer: COMMERCIAL

## 2024-03-14 VITALS
DIASTOLIC BLOOD PRESSURE: 85 MMHG | OXYGEN SATURATION: 98 % | HEART RATE: 68 BPM | SYSTOLIC BLOOD PRESSURE: 132 MMHG | BODY MASS INDEX: 29.59 KG/M2 | WEIGHT: 161.8 LBS

## 2024-03-14 DIAGNOSIS — Z01.419 ENCOUNTER FOR GYNECOLOGICAL EXAMINATION WITHOUT ABNORMAL FINDING: Primary | ICD-10-CM

## 2024-03-14 DIAGNOSIS — O98.519 HERPES SIMPLEX TYPE 2 (HSV-2) INFECTION AFFECTING PREGNANCY, ANTEPARTUM: ICD-10-CM

## 2024-03-14 DIAGNOSIS — B00.9 HERPES SIMPLEX TYPE 2 (HSV-2) INFECTION AFFECTING PREGNANCY, ANTEPARTUM: ICD-10-CM

## 2024-03-14 DIAGNOSIS — Z30.011 ENCOUNTER FOR INITIAL PRESCRIPTION OF CONTRACEPTIVE PILLS: ICD-10-CM

## 2024-03-14 PROCEDURE — 99395 PREV VISIT EST AGE 18-39: CPT | Performed by: OBSTETRICS & GYNECOLOGY

## 2024-03-14 RX ORDER — VALACYCLOVIR HYDROCHLORIDE 500 MG/1
500 TABLET, FILM COATED ORAL DAILY
Qty: 90 TABLET | Refills: 3 | Status: SHIPPED | OUTPATIENT
Start: 2024-03-14

## 2024-03-14 RX ORDER — NORGESTIMATE AND ETHINYL ESTRADIOL 0.25-0.035
1 KIT ORAL DAILY
Qty: 84 TABLET | Refills: 3 | Status: SHIPPED | OUTPATIENT
Start: 2024-03-14

## 2024-03-14 NOTE — PROGRESS NOTES
"Meche is a 33 year old  here for annual exam. She is currently using combination pills  for birth control.      ROS: Ten point review of systems was reviewed and negative except the above.      Last paps:  No results found for: \"PAP\"  Last cholesterol: No results found for: \"CHOL\"]        Past Surgical History:   Procedure Laterality Date    MASTECTOMY SIMPLE BILATERAL, SENTINEL NODE BILATERAL, COMBINED Bilateral 2023    Procedure: bilateral skin sparing mastectomies with right sentinel lymph node biopsy;  Surgeon: Sandra Ulloa MD;  Location: [facility] OR    RECONSTRUCT BREAST BILATERAL, IMPLANT PROSTHESIS BILATERAL, COMBINED Bilateral 2023    Procedure: BILATERAL DIRECT TO IMPLANT PREPECTORAL BREAST RECONSTRUCTION WITH ALLODERM DIRECT TO IMPLANT;  Surgeon: Earnestine Peace MD;  Location: [facility] OR    wisdom teeth extraction       Past Medical History:   Diagnosis Date    Breast cancer (H)     RIGHT    Elevated blood pressure reading without diagnosis of hypertension     DURING PREGNANCY    Heartburn     DURING PREGNANCY    HSV (herpes simplex virus) infection     Migraine     Uncomplicated asthma         .   No Known Allergies    Family History   Problem Relation Age of Onset    No Known Problems Mother     Asthma Father     Asthma Sister     No Known Problems Brother      Social History     Socioeconomic History    Marital status:      Spouse name: Not on file    Number of children: Not on file    Years of education: Not on file    Highest education level: Not on file   Occupational History    Not on file   Tobacco Use    Smoking status: Former     Types: Cigarettes     Quit date: 2022     Years since quittin.1    Smokeless tobacco: Never   Vaping Use    Vaping Use: Never used   Substance and Sexual Activity    Alcohol use: Not Currently    Drug use: Never    Sexual activity: Yes     Partners: Male     Birth control/protection: Pill   Other Topics Concern    Not on file   Social History " Narrative    Not on file     Social Determinants of Health     Financial Resource Strain: Low Risk  (12/6/2023)    Financial Resource Strain     Within the past 12 months, have you or your family members you live with been unable to get utilities (heat, electricity) when it was really needed?: No   Food Insecurity: Low Risk  (12/6/2023)    Food Insecurity     Within the past 12 months, did you worry that your food would run out before you got money to buy more?: No     Within the past 12 months, did the food you bought just not last and you didn t have money to get more?: No   Transportation Needs: Low Risk  (12/6/2023)    Transportation Needs     Within the past 12 months, has lack of transportation kept you from medical appointments, getting your medicines, non-medical meetings or appointments, work, or from getting things that you need?: No   Physical Activity: Not on file   Stress: Not on file   Social Connections: Not on file   Interpersonal Safety: Low Risk  (10/13/2023)    Interpersonal Safety     Do you feel physically and emotionally safe where you currently live?: Yes     Within the past 12 months, have you been hit, slapped, kicked or otherwise physically hurt by someone?: No     Within the past 12 months, have you been humiliated or emotionally abused in other ways by your partner or ex-partner?: No   Housing Stability: Low Risk  (12/6/2023)    Housing Stability     Do you have housing? : Yes     Are you worried about losing your housing?: No           PE: /85 (BP Location: Left arm, Patient Position: Sitting, Cuff Size: Adult Regular)   Pulse 68   Wt 73.4 kg (161 lb 12.8 oz)   LMP 02/29/2024   SpO2 98%   BMI 29.59 kg/m    Body mass index is 29.59 kg/m .    General Appearance:  healthy, alert, active, no distress  Cardiovascular:  Regular rate and Rhythm  Neck: Supple, no adenopathy, and thyroid normal  Lungs:  Clear, without wheeze, rale or rhonchi  Breast: s/p breast reconstruction, no  masses or chest wall nodularity  Abdomen: Benign, Soft, flat, non-tender, No masses, organomegaly, No inguinal nodes, and Bowel sounds normoactive.   Pelvic:       - Ext: Vulva and perineum are normal without lesion, mass or discharge        - Urethra: normal without discharge or scarring no hypermobility       - Bladder: No tenderness, No masses       - Vagina: without discharge and rugated       - Cervix: multiparous       - Uterus:Normal shape, position and consistency       - Adnexa: Normal without masses or tenderness       - Rectal: deferred    A/P Well Woman,      -  I discussed the new pap recommendations regarding screening.  Explained the rationale for increased intervals between paps.  Questions asked and answered.  She does agree to this regiment.    - Pap was not performed   -  BC: combination pills      - Labs:   Orders Placed This Encounter   Procedures    Glucose    Lipid panel reflex to direct LDL Fasting       -  Encouraged healthy diet, regular exercise, self-breast examination.  Emiliano Clark MD FACOG

## 2024-03-20 ENCOUNTER — VIRTUAL VISIT (OUTPATIENT)
Dept: ONCOLOGY | Facility: CLINIC | Age: 34
End: 2024-03-20
Attending: SURGERY
Payer: COMMERCIAL

## 2024-03-20 DIAGNOSIS — D05.11 DUCTAL CARCINOMA IN SITU (DCIS) OF RIGHT BREAST: Primary | ICD-10-CM

## 2024-03-20 PROCEDURE — 96040 HC GENETIC COUNSELING, EACH 30 MINUTES: CPT | Mod: TEL,95 | Performed by: GENETIC COUNSELOR, MS

## 2024-03-20 NOTE — Clinical Note
3/20/2024         RE: Meche Morrow  [address] [facility] [address]        Dear Colleague,    Thank you for referring your patient, Meche Morrow, to the [facility]. Please see a copy of my visit note below.    3/20/2024    Referring Provider: Sandra Ulloa MD    Presenting Information:   I met with Meche for her telephone genetic counseling visit, through the Cancer Risk Management Program, to discuss her personal history of breast cancer. Today we reviewed this history, cancer screening recommendations, and available genetic testing options.    Personal History:  Meche is a 33 year old year old female. 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    She had her first menstrual period at age 13, her first child at age 32, and is premenopausal. Meche has her ovaries, fallopian tubes and uterus in place, and she has had no ovarian cancer screening to date. She reports that she has not used hormone replacement therapy.      She had a mammogram in December 2022 which found an irregular, hypoechoic mass that was identified as DCIS. Meche has not had a colonoscopy. She does not regularly do any other cancer screening at this time. Meche reported previous tobacco use.    Family History: (Please see scanned pedigree for detailed family history information)  There is no reported family history of of cancer on either side of the family.   Her maternal ethnicity is [place] and [place]ic. Her paternal ethnicity is Polish. There is no known Ashkenazi Zoroastrian ancestry on either side of her family. There is no reported consanguinity.    Discussion:  Meche's personal history of breast cancer is suggestive of a hereditary cancer syndrome.  We reviewed the features of sporadic, familial, and hereditary cancers. We discussed that mutations in either BRCA1 or BRCA2 could be  possible hereditary explanations for her family history of cancer. Mutations in the BRCA1 or BRCA2 gene are known to cause Hereditary Breast and Ovarian Cancer Syndrome (HBOC). HBOC typically presents with multiple family members diagnosed with breast cancer before age 50 and/or ovarian cancer. Other cancer risks associated with HBOC include male breast cancer, prostate cancer, pancreatic cancer, and melanoma.   We discussed the natural history and genetics of hereditary cancer. A detailed handout regarding hereditary cancer, along with the other information we discussed, will be mailed to Meche at the end of our appointment today and can be found in the after visit summary. Topics included: inheritance pattern, cancer risks, cancer screening recommendations, and also risks, benefits and limitations of testing.  Based on her personal and family history, Meche meets current National Comprehensive Cancer Network (NCCN) criteria for genetic testing of BRCA1/2 along with other high-penetrance breast cancer susceptibility genes (I.e. CDH1, PALB2, PTEN, and TP53).  We discussed that there are additional genes that could cause increased risk for breast cancer. As many of these genes present with overlapping features in a family and accurate cancer risk cannot always be established based upon the pedigree analysis alone, it would be reasonable for Meche to consider panel genetic testing to analyze multiple genes at once.  Genetic testing is available for BRCA1/2 as part of the patient's core panel. This will then be automatically reflexed to [employer]'s Common Hereditary Cancers panel.  Genetic testing is available for 48 genes associated with hereditary cancer: Common Hereditary Cancers panel (APC, RHYS, AXIN2, BAP1, BARD1, BMPR1A, BRCA1, BRCA2, BRIP1, CDH1, CDK4, CDKN2A, CHEK2, CTNNA1, DICER1, EPCAM, FH, GREM1, HOXB13, KIT, MBD4, MEN1, MLH1, MSH2, MSH3, MSH6, MUTYH, NF1, NTHL1, PALB2, PDGFRA, PMS2, POLD1, POLE, PTEN, RAD51C,  RAD51D, SDHA, SDHB, SDHC, SDHD, SMAD4, SMARCA4, STK11, TP53, TSC1, TSC2, and VHL).  We discussed that many of the genes in the Common Hereditary Cancers panel are associated with specific hereditary cancer syndromes and published management guidelines: Hereditary Breast and Ovarian Cancer syndrome (BRCA1, BRCA2), Chen syndrome (MLH1, MSH2, MSH6, PMS2, EPCAM), Familial Adenomatous Polyposis (APC), Hereditary Diffuse Gastric Cancer (CDH1), Familial Atypical Multiple Mole Melanoma syndrome (CDK4, CDKN2A), Juvenile Polyposis syndrome (BMPR1A, SMAD4), Cowden syndrome (PTEN), Li Fraumeni syndrome (TP53), Peutz-Jeghers syndrome (STK11), MUTYH Associated Polyposis (MUTYH), Tuberous Sclerosis complex (TSC1, TSC2), Neurofibromatosis type 1 (NF1), Hereditary Leiomyomatosis and Renal Cell Carcinoma (FH), Multiple Endocrine Neoplasia type 1 (MEN1), Hereditary Paraganglioma and Pheochromocytoma (SDHA, SDHB, SDHC, SDHD), and von Hippel-Lindau (VHL).   The RHYS, AXIN2, BRIP1, CHEK2, GREM1, MSH3, NTHL1, PALB2, POLD1, POLE, RAD51C, and RAD51D genes are associated with increased cancer risk and have published management guidelines for certain cancers.    The remaining genes (BAP1, BARD1, CTNNA1, DICER1, HOXB13, KIT, MBD4, PDGFRA, and SMARCA4) are associated with increased cancer risk and may allow us to make medical recommendations when mutations are identified.    Meche stated that she would prefer to submit a saliva kit for her genetic testing. [employer] will send a kit directly to her home with directions on how to collect a saliva sample. We discussed that there is a small chance for sample failure due to contamination of the sample. To help minimize this, she should follow the directions that are sent with the kit. Meche verbalized understanding of this. Once the sample is collected, she will send it to [employer] using the return envelope and prepaid shipping label.   Verbal consent was given over the phone and written on the consent  form. Turnaround time is approximately 4 weeks once the lab receives the sample.  Should Meche have any questions regarding the billing for her genetic testing, she should visit [employer]'s billing website at [email or web address] or call them at [phone].  Medical Management: For Meche, we reviewed that the information from genetic testing may determine:  additional cancer screening for which Meche may qualify (i.e. more frequent colonoscopies, more frequent dermatologic exams, etc.),  options for risk reducing surgeries Meche could consider (i.e. surgery to remove her ovaries and/or uterus),    and targeted chemotherapies if she were to develop certain cancers in the future (i.e. immunotherapy for individuals with Chen syndrome, PARP inhibitors, etc.).   These recommendations and possible targeted chemotherapies will be discussed in detail once genetic testing is completed.     Plan:  1) Today Meche elected to proceed with BRCA1/2 testing with automatic reflex to [employer]'s Common Hereditary Cancers panel.  2) A copy of the consent form and the after visit summary will be sent to Meche.  3) This information should be available in approximately 4 weeks, once the lab receives the sample.  4) I will call Meche with the results once they become available.    Time spent on the phone: 37 minutes    Sean Bai MS, [facility]  Licensed, Certified Genetic Counselor      Virtual Visit Details    Type of service:  Telephone Visit       Again, thank you for allowing me to participate in the care of your patient.        Sincerely,        Sean Bai GC

## 2024-03-20 NOTE — PROGRESS NOTES
3/20/2024    Referring Provider: Sandra Ulloa MD    Presenting Information:   I met with Meche for her telephone genetic counseling visit, through the Cancer Risk Management Program, to discuss her personal history of breast cancer. Today we reviewed this history, cancer screening recommendations, and available genetic testing options.    Personal History:  Meche is a 33 year old year old female. 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    She had her first menstrual period at age 13, her first child at age 32, and is premenopausal. Meche has her ovaries, fallopian tubes and uterus in place, and she has had no ovarian cancer screening to date. She reports that she has not used hormone replacement therapy.      She had a mammogram in December 2022 which found an irregular, hypoechoic mass that was identified as DCIS. Meche has not had a colonoscopy. She does not regularly do any other cancer screening at this time. Meche reported previous tobacco use.    Family History: (Please see scanned pedigree for detailed family history information)  There is no reported family history of of cancer on either side of the family.   Her maternal ethnicity is Syrian and Spanish. Her paternal ethnicity is Polish. There is no known Ashkenazi Roman Catholic ancestry on either side of her family. There is no reported consanguinity.    Discussion:  Meche's personal history of breast cancer is suggestive of a hereditary cancer syndrome.  We reviewed the features of sporadic, familial, and hereditary cancers. We discussed that mutations in either BRCA1 or BRCA2 could be possible hereditary explanations for her family history of cancer. Mutations in the BRCA1 or BRCA2 gene are known to cause Hereditary Breast and Ovarian Cancer Syndrome (HBOC). HBOC typically presents with multiple family members diagnosed with breast cancer before age 50  and/or ovarian cancer. Other cancer risks associated with HBOC include male breast cancer, prostate cancer, pancreatic cancer, and melanoma.   We discussed the natural history and genetics of hereditary cancer. A detailed handout regarding hereditary cancer, along with the other information we discussed, will be mailed to Meche at the end of our appointment today and can be found in the after visit summary. Topics included: inheritance pattern, cancer risks, cancer screening recommendations, and also risks, benefits and limitations of testing.  Based on her personal and family history, Meche meets current National Comprehensive Cancer Network (NCCN) criteria for genetic testing of BRCA1/2 along with other high-penetrance breast cancer susceptibility genes (I.e. CDH1, PALB2, PTEN, and TP53).  We discussed that there are additional genes that could cause increased risk for breast cancer. As many of these genes present with overlapping features in a family and accurate cancer risk cannot always be established based upon the pedigree analysis alone, it would be reasonable for Meche to consider panel genetic testing to analyze multiple genes at once.  Genetic testing is available for BRCA1/2 as part of the patient's core panel. This will then be automatically reflexed to [facility]'s Common Hereditary Cancers panel.  Genetic testing is available for 48 genes associated with hereditary cancer: Common Hereditary Cancers panel (APC, RHYS, AXIN2, BAP1, BARD1, BMPR1A, BRCA1, BRCA2, BRIP1, CDH1, CDK4, CDKN2A, CHEK2, CTNNA1, DICER1, EPCAM, FH, GREM1, HOXB13, KIT, MBD4, MEN1, MLH1, MSH2, MSH3, MSH6, MUTYH, NF1, NTHL1, PALB2, PDGFRA, PMS2, POLD1, POLE, PTEN, RAD51C, RAD51D, SDHA, SDHB, SDHC, SDHD, SMAD4, SMARCA4, STK11, TP53, TSC1, TSC2, and VHL).  We discussed that many of the genes in the Common Hereditary Cancers panel are associated with specific hereditary cancer syndromes and published management guidelines: Hereditary Breast  and Ovarian Cancer syndrome (BRCA1, BRCA2), Chen syndrome (MLH1, MSH2, MSH6, PMS2, EPCAM), Familial Adenomatous Polyposis (APC), Hereditary Diffuse Gastric Cancer (CDH1), Familial Atypical Multiple Mole Melanoma syndrome (CDK4, CDKN2A), Juvenile Polyposis syndrome (BMPR1A, SMAD4), Cowden syndrome (PTEN), Li Fraumeni syndrome (TP53), Peutz-Jeghers syndrome (STK11), MUTYH Associated Polyposis (MUTYH), Tuberous Sclerosis complex (TSC1, TSC2), Neurofibromatosis type 1 (NF1), Hereditary Leiomyomatosis and Renal Cell Carcinoma (FH), Multiple Endocrine Neoplasia type 1 (MEN1), Hereditary Paraganglioma and Pheochromocytoma (SDHA, SDHB, SDHC, SDHD), and von Hippel-Lindau (VHL).   The RHYS, AXIN2, BRIP1, CHEK2, GREM1, MSH3, NTHL1, PALB2, POLD1, POLE, RAD51C, and RAD51D genes are associated with increased cancer risk and have published management guidelines for certain cancers.    The remaining genes (BAP1, BARD1, CTNNA1, DICER1, HOXB13, KIT, MBD4, PDGFRA, and SMARCA4) are associated with increased cancer risk and may allow us to make medical recommendations when mutations are identified.    Meche stated that she would prefer to submit a saliva kit for her genetic testing. [facility] will send a kit directly to her home with directions on how to collect a saliva sample. We discussed that there is a small chance for sample failure due to contamination of the sample. To help minimize this, she should follow the directions that are sent with the kit. Meche verbalized understanding of this. Once the sample is collected, she will send it to [facility] using the return envelope and prepaid shipping label.   Verbal consent was given over the phone and written on the consent form. Turnaround time is approximately 4 weeks once the lab receives the sample.  Should Meche have any questions regarding the billing for her genetic testing, she should visit [employer]'s billing website at [email or web address]  or call them at [phone].  Medical Management: For Meche, we reviewed that the information from genetic testing may determine:  additional cancer screening for which Meche may qualify (i.e. more frequent colonoscopies, more frequent dermatologic exams, etc.),  options for risk reducing surgeries Meche could consider (i.e. surgery to remove her ovaries and/or uterus),    and targeted chemotherapies if she were to develop certain cancers in the future (i.e. immunotherapy for individuals with Chen syndrome, PARP inhibitors, etc.).   These recommendations and possible targeted chemotherapies will be discussed in detail once genetic testing is completed.     Plan:  1) Today Meche elected to proceed with BRCA1/2 testing with automatic reflex to [facility]'s Common Hereditary Cancers panel.  2) A copy of the consent form and the after visit summary will be sent to Meche.  3) This information should be available in approximately 4 weeks, once the lab receives the sample.  4) I will call Meche with the results once they become available.    Time spent on the phone: 37 minutes    Sean Bai MS, [facility]  Licensed, Certified Genetic Counselor      Virtual Visit Details    Type of service:  Telephone Visit

## 2024-03-20 NOTE — LETTER
March 20, 2024    Meche Morrow  [address] [address] [facility] [address]      Dear Meche,    It was a pleasure speaking with you on the phone on 3/20/2024. Here is a copy of the progress note from our discussion. If you have any additional questions, please feel free to call.    Referring Provider: Sandra Ulloa MD    Presenting Information:   I met with Meche for her telephone genetic counseling visit, through the Cancer Risk Management Program, to discuss her personal history of breast cancer. Today we reviewed this history, cancer screening recommendations, and available genetic testing options.    Personal History:  Meche is a 33 year old year old female. 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    She had her first menstrual period at age 13, her first child at age 32, and is premenopausal. Meche has her ovaries, fallopian tubes and uterus in place, and she has had no ovarian cancer screening to date. She reports that she has not used hormone replacement therapy.      She had a mammogram in December 2022 which found an irregular, hypoechoic mass that was identified as DCIS. Meche has not had a colonoscopy. She does not regularly do any other cancer screening at this time. Meche reported previous tobacco use.    Family History: (Please see scanned pedigree for detailed family history information)  There is no reported family history of of cancer on either side of the family.   Her maternal ethnicity is Citizen of [place] and [place]. Her paternal ethnicity is Polish. There is no known Ashkenazi Restorationist ancestry on either side of her family. There is no reported consanguinity.    Discussion:  Meche's personal history of breast cancer is suggestive of a hereditary cancer syndrome.  We reviewed the features of sporadic, familial, and hereditary cancers. We discussed that mutations in either BRCA1 or BRCA2  could be possible hereditary explanations for her family history of cancer. Mutations in the BRCA1 or BRCA2 gene are known to cause Hereditary Breast and Ovarian Cancer Syndrome (HBOC). HBOC typically presents with multiple family members diagnosed with breast cancer before age 50 and/or ovarian cancer. Other cancer risks associated with HBOC include male breast cancer, prostate cancer, pancreatic cancer, and melanoma.   We discussed the natural history and genetics of hereditary cancer. A detailed handout regarding hereditary cancer, along with the other information we discussed, will be mailed to Meche at the end of our appointment today and can be found in the after visit summary. Topics included: inheritance pattern, cancer risks, cancer screening recommendations, and also risks, benefits and limitations of testing.  Based on her personal and family history, Meche meets current National Comprehensive Cancer Network (NCCN) criteria for genetic testing of BRCA1/2 along with other high-penetrance breast cancer susceptibility genes (I.e. CDH1, PALB2, PTEN, and TP53).  We discussed that there are additional genes that could cause increased risk for breast cancer. As many of these genes present with overlapping features in a family and accurate cancer risk cannot always be established based upon the pedigree analysis alone, it would be reasonable for Meche to consider panel genetic testing to analyze multiple genes at once.  Genetic testing is available for BRCA1/2 as part of the patient's core panel. This will then be automatically reflexed to [facility]'s Common Hereditary Cancers panel.  Genetic testing is available for 48 genes associated with hereditary cancer: Common Hereditary Cancers panel (APC, RHYS, AXIN2, BAP1, BARD1, BMPR1A, BRCA1, BRCA2, BRIP1, CDH1, CDK4, CDKN2A, CHEK2, CTNNA1, DICER1, EPCAM, FH, GREM1, HOXB13, KIT, MBD4, MEN1, MLH1, MSH2, MSH3, MSH6, MUTYH, NF1, NTHL1, PALB2, PDGFRA, PMS2, POLD1, POLE,  PTEN, RAD51C, RAD51D, SDHA, SDHB, SDHC, SDHD, SMAD4, SMARCA4, STK11, TP53, TSC1, TSC2, and VHL).  We discussed that many of the genes in the Common Hereditary Cancers panel are associated with specific hereditary cancer syndromes and published management guidelines: Hereditary Breast and Ovarian Cancer syndrome (BRCA1, BRCA2), Chen syndrome (MLH1, MSH2, MSH6, PMS2, EPCAM), Familial Adenomatous Polyposis (APC), Hereditary Diffuse Gastric Cancer (CDH1), Familial Atypical Multiple Mole Melanoma syndrome (CDK4, CDKN2A), Juvenile Polyposis syndrome (BMPR1A, SMAD4), Cowden syndrome (PTEN), Li Fraumeni syndrome (TP53), Peutz-Jeghers syndrome (STK11), MUTYH Associated Polyposis (MUTYH), Tuberous Sclerosis complex (TSC1, TSC2), Neurofibromatosis type 1 (NF1), Hereditary Leiomyomatosis and Renal Cell Carcinoma (FH), Multiple Endocrine Neoplasia type 1 (MEN1), Hereditary Paraganglioma and Pheochromocytoma (SDHA, SDHB, SDHC, SDHD), and von Hippel-Lindau (VHL).   The RHYS, AXIN2, BRIP1, CHEK2, GREM1, MSH3, NTHL1, PALB2, POLD1, POLE, RAD51C, and RAD51D genes are associated with increased cancer risk and have published management guidelines for certain cancers.    The remaining genes (BAP1, BARD1, CTNNA1, DICER1, HOXB13, KIT, MBD4, PDGFRA, and SMARCA4) are associated with increased cancer risk and may allow us to make medical recommendations when mutations are identified.    Meche stated that she would prefer to submit a saliva kit for her genetic testing. [facility] will send a kit directly to her home with directions on how to collect a saliva sample. We discussed that there is a small chance for sample failure due to contamination of the sample. To help minimize this, she should follow the directions that are sent with the kit. Meche verbalized understanding of this. Once the sample is collected, she will send it to [facility] using the return envelope and prepaid shipping label.   Verbal consent was given over the phone and written  on the consent form. Turnaround time is approximately 4 weeks once the lab receives the sample.  Should Meche have any questions regarding the billing for her genetic testing, she should visit [facility]'s billing website at [email or web address] or call them at [phone].  Medical Management: For Meche, we reviewed that the information from genetic testing may determine:  additional cancer screening for which Meche may qualify (i.e. more frequent colonoscopies, more frequent dermatologic exams, etc.),  options for risk reducing surgeries Meche could consider (i.e. surgery to remove her ovaries and/or uterus),    and targeted chemotherapies if she were to develop certain cancers in the future (i.e. immunotherapy for individuals with Chen syndrome, PARP inhibitors, etc.).   These recommendations and possible targeted chemotherapies will be discussed in detail once genetic testing is completed.     Plan:  1) Today Meche elected to proceed with BRCA1/2 testing with automatic reflex to [facility]'s Common Hereditary Cancers panel.  2) A copy of the consent form and the after visit summary will be sent to Meche.  3) This information should be available in approximately 4 weeks, once the lab receives the sample.  4) I will call Meche with the results once they become available.    Time spent on the phone: 37 minutes    Sean Bai MS, [facility] [place]  Licensed, Certified Genetic Counselor

## 2024-03-20 NOTE — NURSING NOTE
Is the patient currently in the state of [place]? YES    Visit mode:TELEPHONE    If the visit is dropped, the patient can be reconnected by: TELEPHONE VISIT: Phone number: [phone]    Will anyone else be joining the visit? NO  (If patient encounters technical issues they should call [phone]    How would you like to obtain your AVS? MyChart    Are changes needed to the allergy or medication list? N/A    Reason for visit: Consult    Hue BALLESTEROS

## 2024-03-20 NOTE — PATIENT INSTRUCTIONS
Assessing Cancer Risk  Cancer is a common diagnosis which impacts many families.  Individuals may develop cancer due to environmental factors (such as exposures and lifestyle), aging, genetic predisposition, or a combination of these factors.      Only about 5-10% of cancers are thought to be due to an inherited cancer susceptibility gene.    These families often have:  Several people with the same or related types of cancer  Cancers diagnosed at a young age (before age 50)  Individuals with more than one primary cancer  Multiple generations of the family affected with cancer    Comprehensive Breast and Gynecologic Cancer Panel  We each inherit two copies of every gene in our bodies: one from our mother, and one from our father. Each gene has a specific job to do.  When a gene has a mistake or  mutation  in it, it does not work like it should.     Some people may be candidates for genetic testing of more than one gene.  For these families, genetic testing using a cancer panel may be offered. These panels will test different genes at once known to increase the risk for breast, ovarian, uterine, and/or other cancers.    This handout will review common hereditary breast and gynecologic cancer syndromes. The genes that will be discussed in this handout are: RHYS, BRCA1, BRCA2, BRIP1, CDH1, CHEK2, MLH1, MSH2, MSH6, PMS2, EPCAM, PTEN, PALB2, RAD51C, RAD51D, and TP53.    The purpose of this handout is to serve as a brief summary of the breast and gynecologic cancer risk genes that have published clinical management guidelines for individuals who are found to carry a mutation. Inheriting a mutation does not mean a person will develop cancer, but it does significantly increase their risk above the general population risk.     ______________________________________________________________________________    Hereditary Breast and Ovarian Cancer Syndrome (BRCA1 and BRCA2)  A single mutation in one of the copies of BRCA1 or  BRCA2 increases the risk for breast and ovarian cancer, among others.  The risk for pancreatic cancer and melanoma may also be slightly increased in some families.  The chart below shows the chance that someone with a BRCA mutation would develop cancer in his or her lifetime1,2,3,4.       Lifetime Cancer Risks    General Population BRCA1  BRCA2   Breast  12% >60% >60%   Ovarian  1-2% 39-58% 13-29%   Prostate 12% 7-26% 19-61%   Male Breast 0.1% 0.2-1.2% 1.8-7.1%   Pancreas 1-2% Up to 5% 5-10%     A person s ethnic background is also important to consider, as individuals of Ashkenazi Shinto ancestry have a higher chance of having a BRCA gene mutation.  There are three BRCA mutations that occur more frequently in this population.      Chen Syndrome (MLH1, MSH2, MSH6, PMS2, and EPCAM)  Currently five genes are known to cause Chen Syndrome: MLH1, MSH2, MSH6, PMS2, and EPCAM.  A single mutation in one of the Chen Syndrome genes increases the risk for colon, endometrial, ovarian, and stomach cancers.  Other cancers that occur less commonly in Chen Syndrome include urinary tract, skin, and brain cancers.  The chart below shows the chance that a person with Chen syndrome would develop cancer in his or her lifetime5.      Lifetime Cancer Risks    General Population Chen Syndrome   Colon 5% 10-61%   Endometrial 3% 13-57%   Ovarian 1-2% 1-38%   Stomach <1% 1-9%   *Cancer risk varies depending on Chen syndrome gene found      Cowden Syndrome (PTEN)  Cowden syndrome is a hereditary condition that increases the risk for breast, thyroid, endometrial, colon, and kidney cancer.  Cowden syndrome is caused by a mutation in the PTEN gene.  A single mutation in one of the copies of PTEN causes Cowden syndrome and increases cancer risk.  The chart below shows the chance that someone with a PTEN mutation would develop cancer in their lifetime6,7.  Other benign features seen in some individuals with Cowden syndrome include benign  skin lesions (facial papules, keratoses, lipomas), learning disability, autism, thyroid nodules, colon polyps, and larger head size.     Lifetime Cancer Risks    General Population Cowden   Breast 12% 40-60%*   Thyroid 1% Up to 38%   Renal 1-2% Up to 35%   Endometrial 3% Up to 28%   Colon 5% Up to 9%   Melanoma 2-3% Up to 6%   *Emerging data suggests the risk for breast cancer could be greater than 60%               Li-Fraumeni Syndrome (TP53)  Li-Fraumeni Syndrome (LFS) is a cancer predisposition syndrome caused by a mutation in the TP53 gene. A single mutation in one of the copies of TP53 increases the risk for multiple cancers. Individuals with LFS are at an increased risk for developing cancer at a young age. The lifetime risk for development of a LFS-associated cancer is 50% by age 30 and 90% by age 60.   Core Cancers: Sarcomas, Breast, Brain, Lung, Leukemias/Lymphomas, Adrenocortical carcinomas  Other Cancers: Gastrointestinal, Thyroid, Skin, Genitourinary       Hereditary Diffuse Gastric Cancer (CDH1)  Currently, one gene is known to cause hereditary diffuse gastric cancer (HDGC): CDH1.  Individuals with HDGC are at increased risk for diffuse gastric cancer and lobular breast cancer. Of people diagnosed with HDGC, 30-50% have a mutation in the CDH1 gene.  This suggests there are likely other genes that may cause HDGC that have not been identified yet.      Lifetime Cancer Risks    General Population HDGC   Diffuse Gastric  <1% ~80%   Breast 12% 41-60%       Additional Genes    RHYS  RHYS is a moderate-risk breast cancer gene. Women who have a mutation in RHYS can have between a 2-4 fold increased risk for breast cancer compared to the general population8. RHYS mutations have also been associated with increased risk for pancreatic cancer between 5-10%9. Individuals who inherit two RHYS mutations have a condition called ataxia-telangiectasia (AT).  This rare autosomal recessive condition affects the nervous system  and immune system, and is associated with progressive cerebellar ataxia beginning in childhood. Individuals with ataxia-telangiectasia often have a weakened immune system and have an increased risk for childhood cancers.    PALB2  Mutations in PALB2 have been shown to increase the risk of breast cancer up to 41-60% in some families; where individuals fall within this risk range is dependent upon family [id number]. PALB2 mutations have also been associated with increased risk for pancreatic cancer between 5-10%.  Individuals who inherit two PALB2 mutations--one from their mother and one from their father--have a condition called Fanconi Anemia.  This rare autosomal recessive condition is associated with short stature, developmental delay, bone marrow failure, and increased risk for childhood cancers.    CHEK2   CHEK2 is a moderate-risk breast cancer gene.  Women who have a mutation in CHEK2 have around a 2-4 fold increased risk for breast cancer compared to the general population, and this risk may be higher depending upon family history.11,12,13 The risk of colon cancer may be twice as high as the general population risk of colon cancer of 5%. Mutations in CHEK2 have also been shown to increase the risk of other cancers, including prostate, however these cancer risks are currently not well understood.    BRIP1, RAD51C and RAD51D  Mutations in RAD51C and RAD51D have been shown to increase the risk of ovarian cancer and breast cancer 14,. Mutations in BRIP1 have been shown to increase the risk of ovarian cancer and possibly female breast cancer 15 .       Lifetime Cancer Risk    General Population        BRIP1   RAD51C  RAD51D   Breast 12% Not well defined 20-40% 20-40%   Ovarian 1-2% 5-15% 10-15% 10-20%     ______________________________________________________________  Inheritance  All of the cancer syndromes reviewed above are inherited in an autosomal dominant pattern.  This means that if a parent has a mutation,  each of their children will have a 50% chance of inheriting that same mutation. Therefore, each child --male or female-- would have a 50% chance of being at increased risk for developing cancer.    Image obtained from Genetics Home Reference, 2013     Mutations in some genes can occur de yina, which means that a person s mutation occurred for the first time in them and was not inherited from a parent.  Now that they have the mutation, however, it can be passed on to future generations.    Genetic Testing  Genetic testing involves a blood test and will look for any harmful mutations that are associated with increased cancer risk.  If possible, it is recommended that the person(s) who has had cancer be tested before other family members.  That person will give us the most useful information about whether or not a specific gene is associated with the cancer in the family.    Results  There are three possible results of genetic testing:  Positive--a harmful mutation was identified in one or more of the genes  Negative--no mutations were identified in any of the genes tested  Variant of unknown significance--a variation in one of the genes was identified, but it is unclear how this impacts cancer risk in the family    Advantages and Disadvantages   There are advantages and disadvantages to genetic testing.    Advantages  May clarify your cancer risk  Can help you make medical decisions  May explain the cancers in your family  May give useful information to your family members (if you share your results)    Disadvantages  Possible negative emotional impact of learning about inherited cancer risk  Uncertainty in interpreting a negative test result in some situations  Possible genetic discrimination concerns (see below)    Genetic Information Nondiscrimination Act (ANASTASIA)  The Genetic Information Nondiscrimination Act of 2008 (ANASTASIA) is a federal law that protects individuals from health insurance or employment discrimination  based on a genetic test result alone (with some exceptions, including employers with fewer than 15 employees, and ).  Although rare, ANASTASIA  does not cover discrimination protections in terms of life insurance, long term care, or disability insurances.  Visit the National Human [employer] [place] website to learn more.    Reducing Cancer Risk  All of the genes described in this handout have nationally recognized cancer screening guidelines that would be recommended for individuals who test positive.  In addition to increased cancer screening, surgeries may be offered or recommended to reduce cancer risk.  Recommendations are based upon an individual s genetic test result as well as their personal and family history of cancer.    Questions to Think About Regarding Genetic Testing:  What effect will the test result have on me and my relationship with my family members if I have an inherited gene mutation?  If I don t have a gene mutation?  Should I share my test results, and how will my family react to this news, which may also affect them?  Are my children ready to learn new information that may one day affect their own health?    Hereditary Cancer Resources    FORCE: Facing Our Risk of Cancer Empowered facingourrisk.org   Bright Pink bebrightpink.org   Li-Fraumeni Syndrome Association lfsassociation.org   PTEN World PTENworld.com   No stomach for cancer, Inc. nostomachforcancer.org   Stomach cancer relief network Scrnet.org   Collaborative Group of the Americas on Inherited Colorectal Cancer (CGA) cgaicc.com    Cancer Care cancercare.org   American Cancer Society (ACS) cancer.org   National Cancer [place] (NCI) cancer.gov     Please call us if you have any questions or concerns.   Cancer Risk Management Program [phone]-CANCER ([phone])  Sean Bai, MS [facility]  [phone]  Zeina Jamil, MS, [facility] [phone]  Yue Sandy, MS, [facility]  [phone]  Anastasiya Bailey, MS, [facility]  [phone]  Lori Keller,  MS, [facility]  [phone]  Alfreda Belcher, MS, [facility] [phone]  Viviana Plummer, MS, [facility] [phone]    References  Jason Beltran PDP, Prabhakar S, Patricio UMANA, Mara JE, Armando JL, Regine N, Norma H, Lobo O, Carloz A, Pasini B, Radimichelle P, Manrossana S, Christine DM, Harvey N, Jake E, James H, Kerr E, Blu J, Gronkeke J, Vadim B, Tulinius H, Thorlacius S, Eerola H, Nevanlinna H, Harriett K, Antwan OP. Average risks of breast and ovarian cancer associated with BRCA1 or BRCA2 mutations detected in case series unselected for family history: a combined analysis of 222 studies. Am J Hum Jyoti. 2003;72:1117-30.  Elaine N, Génesis M, Kely G.  BRCA1 and BRCA2 Hereditary Breast and Ovarian Cancer. Gene Reviews online. 2013.  Cy YC, Adeline S, Judd G, Blake S. Breast cancer risk among male BRCA1 and BRCA2 mutation carriers. J Natl Cancer Inst. 2007;99:1811-4.  Howard HO, Gladis I, Dev J, Nia E, Lynn ER, Alfredo F. Risk of breast cancer in male BRCA2 carriers. J Med Jyoti. 2010;47:710-1.  National Comprehensive Cancer Network. Clinical practice guidelines in oncology, colorectal cancer screening. Available online (registration required). 2015.  Chino MH, Cece J, Sarah J, Tj GONZALEZ, Doris MS, Eng C. Lifetime cancer risks in individuals with germline PTEN mutations. Clin Cancer Res. 2012;18:400-7.  Paul R. Cowden Syndrome: A Critical Review of the Clinical Literature. J Jyoti . 2009:18:13-27.  Leonard EUBANKS, Sergio BLANCHARD, Breanna S, Sachi P, Rachelle T, Torie M, Felice B, Shabana H, Ofelia R, Elida K, Lidya L, Howard HO, Christine BLANCHARD, Jackson DF, Diane MR, The Breast Cancer Susceptibility Collaboration (UK) & Wally ORDONEZ. RHYS mutations that cause ataxia-telangiectasia are breast cancer susceptibility alleles. Nature Genetics. 2006;38:873-875  Jp N , Filippo Y, Latonia J, Kurt L, Glenis BLANCA , Meseret ML, Suki S, Wilner AG, Katherine S, Anthony ML, Abhinav J , Antonette R, Ricardo NAJERA, Maida  JR, Karen VE, John M, Voamberstein B, Junior N, Lul RH, Tiffany KW, and Sachin AP. RHYS mutations in patients with hereditary pancreatic cancer. Cancer Discover. 2012;2:41-46  Kelsie SIMMONS., et al. Breast-Cancer Risk in Families with Mutations in PALB2. NEJM. 2014; 371(6):497-506.  CHEK2 Breast Cancer Case-Control Consortium. CHEK2*1100delC and susceptibility to breast cancer: A collaborative analysis involving 10,860 breast cancer cases and 9,065 controls from 10 studies. Am J Hum Jyoti, 74 (2004), pp. 6797-2840  Zulay T, Yvonne S, Kassidy K, et al. Spectrum of Mutations in BRCA1, BRCA2, CHEK2, and TP53 in Families at High Risk of Breast Cancer. DONNIE. 2006;295(12):9434-8245.   Pippa C, Gabriel D, Stefan EUBANKS, et al. Risk of breast cancer in women with a CHEK2 mutation with and without a family history of breast cancer. J Clin Oncol. 2011;29:5968-8949.  Song H, Armens E, Ramus SJ, et al. Contribution of germline mutations in the RAD51B, RAD51C, and RAD51D genes to ovarian cancer in the population. J Clin Oncol. 2015;33(26):3351-2760. Doi:10.1200/JCO.2015.61.2408.  Jose C T, Antonio DF, Digna P, et al. Mutations in BRIP1 confer high risk of ovarian cancer. Nely Jyoti. 2011;43(11):0599-1823. doi:10.1038/ng.955.

## 2024-04-08 DIAGNOSIS — D05.11 DUCTAL CARCINOMA IN SITU (DCIS) OF RIGHT BREAST: Primary | ICD-10-CM

## 2024-04-11 ENCOUNTER — VIRTUAL VISIT (OUTPATIENT)
Dept: ONCOLOGY | Facility: CLINIC | Age: 34
End: 2024-04-11
Attending: GENETIC COUNSELOR, MS
Payer: COMMERCIAL

## 2024-04-11 DIAGNOSIS — D05.11 DUCTAL CARCINOMA IN SITU (DCIS) OF RIGHT BREAST: Primary | ICD-10-CM

## 2024-04-11 PROCEDURE — 999N000069 HC STATISTIC GENETIC COUNSELING, < 16 MIN: Mod: TEL,95 | Performed by: GENETIC COUNSELOR, MS

## 2024-04-11 NOTE — PROGRESS NOTES
"4/11/2024    Referring Provider: Sandra Ulloa MD    Presenting Information:  I spoke to Meche by telephone today to discuss her genetic testing results. A saliva kit was sent to her house on 3/20/24. The Common Hereditary Cancers panel was ordered from [facility]. This testing was done because of Meche's personal history of breast cancer.    Genetic Testing Result: NEGATIVE  Meche is negative for mutations in APC, RHYS, AXIN2, BAP1, BARD1, BMPR1A, BRCA1, BRCA2, BRIP1, CDH1, CDK4, CDKN2A, CHEK2, CTNNA1, DICER1, EPCAM, FH, GREM1, HOXB13, KIT, MBD4, MEN1, MLH1, MSH2, MSH3, MSH6, MUTYH, NF1, NTHL1, PALB2, PDGFRA, PMS2, POLD1, POLE, PTEN, RAD51C, RAD51D, SDHA, SDHB, SDHC, SDHD, SMAD4, SMARCA4, STK11, TP53, TSC1, TSC2, and VHL. No mutations were found in any of the 48 genes analyzed. This test involved sequencing and deletion/duplication analysis of all genes with the exceptions of EPCAM and GREM1 (deletions/duplications only) and SDHA (sequencing only).       A copy of the test report can be found in the Laboratory tab, dated 3/25/24, and named \"LABORATORY MISCELLANEOUS ORDER\". The report is scanned in as a linked document.    Interpretation:  We discussed a couple of different interpretations of this negative test result.    One explanation may be that there is a different gene or combination of genes and environment that are associated with the cancers in this family.  There is also a small possibility that there is a mutation in one of these genes, and the testing laboratory could not find it with their current testing methods.       Screening:  Based on this negative test result, it is important for Meche and her relatives to refer back to the family history for appropriate cancer screening.    Meche's close female relatives remain at increased risk for breast cancer given their family history. Breast cancer screening is generally recommended to begin approximately 10 years younger than the earliest age of breast " cancer diagnosis in the family, or at age 40, whichever comes first. In this family, screening may begin at age 20. Breast screening options should be discussed with an individual's primary care provider and a Genetic Counselor, to determine at what age to begin screening, what screening is appropriate, and if additional screening (such as breast MRI) is necessary based on personal/family history factors.   Other population cancer screening options, such as those recommended by the American Cancer Society and the National Comprehensive Cancer Network (NCCN), are also appropriate for Meche and her family. These screening recommendations may change if there are changes to Meche's personal and/or family history of cancer. Final screening recommendations should be made by each individual's primary care provider.      Inheritance:  We reviewed autosomal dominant inheritance. We discussed that Meche cannot/did not pass on an identifiable mutation in these genes to her children based on this test result. Mutations in these genes do not skip generations.      Summary:  We do not have an explanation for Meche's personal history of cancer. While no genetic changes were identified, Meche may still be at risk for certain cancers due to family history, environmental factors, or other genetic causes not identified by this test. Because of that, it is important that she continue with cancer screening based on her personal and family history as discussed above.    Genetic testing is rapidly advancing, and new cancer susceptibility genes will most likely be identified in the future. Therefore, I encouraged Meche to contact me annually or if there are changes in her personal or family history. This may change how we assess her cancer risk, screening, and the testing we would offer.    Plan:  1.  A copy of the test results will be sent to Meche.  2. She plans to follow-up with her other providers.  3. She should contact me regularly,  or sooner if her family history changes.    If Meche has any further questions, I encouraged her to contact me via [employer].    Time spent on the phone: 5 minutes.    Sean Bai MS, [facility]  Licensed, Certified Genetic Counselor      Virtual Visit Details    Type of service:  Telephone Visit

## 2024-04-11 NOTE — Clinical Note
"    4/11/2024         RE: Meche Morrow  [id number] [facility] [address]        Dear Colleague,    Thank you for referring your patient, Meche Morrow, to the [facility]. Please see a copy of my visit note below.    4/11/2024    Referring Provider: Sandra Ulloa MD    Presenting Information:  I spoke to Meche by telephone today to discuss her genetic testing results. A saliva kit was sent to her house on 3/20/24. The Common Hereditary Cancers panel was ordered from [employer]. This testing was done because of Meche's personal history of breast cancer.    Genetic Testing Result: NEGATIVE  Meche is negative for mutations in APC, RHYS, AXIN2, BAP1, BARD1, BMPR1A, BRCA1, BRCA2, BRIP1, CDH1, CDK4, CDKN2A, CHEK2, CTNNA1, DICER1, EPCAM, FH, GREM1, HOXB13, KIT, MBD4, MEN1, MLH1, MSH2, MSH3, MSH6, MUTYH, NF1, NTHL1, PALB2, PDGFRA, PMS2, POLD1, POLE, PTEN, RAD51C, RAD51D, SDHA, SDHB, SDHC, SDHD, SMAD4, SMARCA4, STK11, TP53, TSC1, TSC2, and VHL. No mutations were found in any of the 48 genes analyzed. This test involved sequencing and deletion/duplication analysis of all genes with the exceptions of EPCAM and GREM1 (deletions/duplications only) and SDHA (sequencing only).       A copy of the test report can be found in the Laboratory tab, dated 3/25/24, and named \"LABORATORY MISCELLANEOUS ORDER\". The report is scanned in as a linked document.    Interpretation:  We discussed a couple of different interpretations of this negative test result.    One explanation may be that there is a different gene or combination of genes and environment that are associated with the cancers in this family.  There is also a small possibility that there is a mutation in one of these genes, and the testing laboratory could not find it with their current testing methods.       Screening:  Based on this negative test result, it is important for Meche and her relatives to refer back to the " family history for appropriate cancer screening.    Meche's close female relatives remain at increased risk for breast cancer given their family history. Breast cancer screening is generally recommended to begin approximately 10 years younger than the earliest age of breast cancer diagnosis in the family, or at age 40, whichever comes first. In this family, screening may begin at age 20. Breast screening options should be discussed with an individual's primary care provider and a Genetic Counselor, to determine at what age to begin screening, what screening is appropriate, and if additional screening (such as breast MRI) is necessary based on personal/family history factors.   Other population cancer screening options, such as those recommended by the American Cancer Society and the National Comprehensive Cancer Network (NCCN), are also appropriate for Meche and her family. These screening recommendations may change if there are changes to Meche's personal and/or family history of cancer. Final screening recommendations should be made by each individual's primary care provider.      Inheritance:  We reviewed autosomal dominant inheritance. We discussed that Meche cannot/did not pass on an identifiable mutation in these genes to her children based on this test result. Mutations in these genes do not skip generations.      Summary:  We do not have an explanation for Meche's personal history of cancer. While no genetic changes were identified, Meche may still be at risk for certain cancers due to family history, environmental factors, or other genetic causes not identified by this test. Because of that, it is important that she continue with cancer screening based on her personal and family history as discussed above.    Genetic testing is rapidly advancing, and new cancer susceptibility genes will most likely be identified in the future. Therefore, I encouraged Meche to contact me annually or if there are changes in  her personal or family history. This may change how we assess her cancer risk, screening, and the testing we would offer.    Plan:  1.  A copy of the test results will be sent to Meche.  2. She plans to follow-up with her other providers.  3. She should contact me regularly, or sooner if her family history changes.    If Meche has any further questions, I encouraged her to contact me via [email or web address].    Time spent on the phone: 5 minutes.    Sean Bai MS, [facility] [place]  Licensed, Certified Genetic Counselor      Virtual Visit Details    Type of service:  Telephone Visit       Again, thank you for allowing me to participate in the care of your patient.        Sincerely,        Sean Bai GC

## 2024-04-11 NOTE — NURSING NOTE
Is the patient currently in the state of [place]? YES    Visit mode:TELEPHONE    If the visit is dropped, the patient can be reconnected by: TELEPHONE VISIT: Phone number:   Telephone Information:   Mobile [phone]       Will anyone else be joining the visit? , Hank, is present  (If patient encounters technical issues they should call [phone])    How would you like to obtain your AVS? MyChart    Are changes needed to the allergy or medication list? N/A    Are refills needed on medications prescribed by this physician? NO    Reason for visit: IRMA BALLESTEROS

## 2024-04-11 NOTE — LETTER
"April 11, 2024    Meche Morrow  [address] [facility] [address]      Dear Meche,    It was a pleasure speaking with you on the phone on 4/11/2024. Here is a copy of the progress note from our discussion. If you have any additional questions, please feel free to call.    Referring Provider: Sandra Ulloa MD    Presenting Information:  I spoke to Meche by telephone today to discuss her genetic testing results. A saliva kit was sent to her house on 3/20/24. The Common Hereditary Cancers panel was ordered from [facility]. This testing was done because of Meche's personal history of breast cancer.    Genetic Testing Result: NEGATIVE  Meche is negative for mutations in APC, RHYS, AXIN2, BAP1, BARD1, BMPR1A, BRCA1, BRCA2, BRIP1, CDH1, CDK4, CDKN2A, CHEK2, CTNNA1, DICER1, EPCAM, FH, GREM1, HOXB13, KIT, MBD4, MEN1, MLH1, MSH2, MSH3, MSH6, MUTYH, NF1, NTHL1, PALB2, PDGFRA, PMS2, POLD1, POLE, PTEN, RAD51C, RAD51D, SDHA, SDHB, SDHC, SDHD, SMAD4, SMARCA4, STK11, TP53, TSC1, TSC2, and VHL. No mutations were found in any of the 48 genes analyzed. This test involved sequencing and deletion/duplication analysis of all genes with the exceptions of EPCAM and GREM1 (deletions/duplications only) and SDHA (sequencing only).       A copy of the test report can be found in the Laboratory tab, dated 3/25/24, and named \"LABORATORY MISCELLANEOUS ORDER\". The report is scanned in as a linked document.    Interpretation:  We discussed a couple of different interpretations of this negative test result.    One explanation may be that there is a different gene or combination of genes and environment that are associated with the cancers in this family.  There is also a small possibility that there is a mutation in one of these genes, and the testing laboratory could not find it with their current testing methods.             Screening:  Based on this negative test result, it is important for Meche and her relatives to refer back to " the family history for appropriate cancer screening.    Meche's close female relatives remain at increased risk for breast cancer given their family history. Breast cancer screening is generally recommended to begin approximately 10 years younger than the earliest age of breast cancer diagnosis in the family, or at age 40, whichever comes first. In this family, screening may begin at age 20. Breast screening options should be discussed with an individual's primary care provider and a Genetic Counselor, to determine at what age to begin screening, what screening is appropriate, and if additional screening (such as breast MRI) is necessary based on personal/family history factors.   Other population cancer screening options, such as those recommended by the American Cancer Society and the National Comprehensive Cancer Network (NCCN), are also appropriate for Meche and her family. These screening recommendations may change if there are changes to Meche's personal and/or family history of cancer. Final screening recommendations should be made by each individual's primary care provider.      Inheritance:  We reviewed autosomal dominant inheritance. We discussed that Meche cannot/did not pass on an identifiable mutation in these genes to her children based on this test result. Mutations in these genes do not skip generations.      Summary:  We do not have an explanation for Meche's personal history of cancer. While no genetic changes were identified, Meche may still be at risk for certain cancers due to family history, environmental factors, or other genetic causes not identified by this test. Because of that, it is important that she continue with cancer screening based on her personal and family history as discussed above.    Genetic testing is rapidly advancing, and new cancer susceptibility genes will most likely be identified in the future. Therefore, I encouraged Meche to contact me annually or if there are changes  in her personal or family history. This may change how we assess her cancer risk, screening, and the testing we would offer.    Plan:  1.  A copy of the test results will be sent to Meche.  2. She plans to follow-up with her other providers.  3. She should contact me regularly, or sooner if her family history changes.    If Meche has any further questions, I encouraged her to contact me via [employer].    Time spent on the phone: 5 minutes.    Sean Bai MS, [facility] [place]  Licensed, Certified Genetic Counselor

## 2024-07-25 ENCOUNTER — PATIENT OUTREACH (OUTPATIENT)
Dept: FAMILY MEDICINE | Facility: CLINIC | Age: 34
End: 2024-07-25
Payer: COMMERCIAL

## 2024-07-25 NOTE — TELEPHONE ENCOUNTER
Patient Quality Outreach    Patient is due for the following:   Asthma  -  ACT needed    Next Steps:   Patient was assigned appropriate questionnaire to complete    Type of outreach:    Sent [employer] message.      Questions for provider review:    None           Sachi Keller, CMA

## 2024-08-23 ENCOUNTER — TELEPHONE (OUTPATIENT)
Dept: SURGERY | Facility: CLINIC | Age: 34
End: 2024-08-23
Payer: COMMERCIAL

## 2024-08-23 NOTE — TELEPHONE ENCOUNTER
Left Voicemail (2nd Attempt) for the patient to call back and schedule the following:    Appointment type: Return  Provider: Dr. Ulloa  Return date: 2/22/2025  Specialty phone number: [phone]  Additonal Notes: Annual exam     [place] [facility]   Dermatology, Surgery, Urology  [facility]

## 2024-12-30 ENCOUNTER — E-VISIT (OUTPATIENT)
Dept: URGENT CARE | Facility: CLINIC | Age: 34
End: 2024-12-30
Payer: COMMERCIAL

## 2024-12-30 DIAGNOSIS — N89.8 VAGINAL DISCHARGE: Primary | ICD-10-CM

## 2024-12-30 NOTE — PATIENT INSTRUCTIONS

## 2025-01-04 ENCOUNTER — LAB (OUTPATIENT)
Dept: LAB | Facility: CLINIC | Age: 35
End: 2025-01-04
Payer: COMMERCIAL

## 2025-01-04 DIAGNOSIS — N89.8 VAGINAL DISCHARGE: ICD-10-CM

## 2025-01-04 PROCEDURE — 87210 SMEAR WET MOUNT SALINE/INK: CPT

## 2025-02-12 ENCOUNTER — PATIENT OUTREACH (OUTPATIENT)
Dept: CARE COORDINATION | Facility: CLINIC | Age: 35
End: 2025-02-12
Payer: COMMERCIAL

## 2025-02-26 ENCOUNTER — PATIENT OUTREACH (OUTPATIENT)
Dept: CARE COORDINATION | Facility: CLINIC | Age: 35
End: 2025-02-26
Payer: COMMERCIAL

## 2025-04-26 ENCOUNTER — HEALTH MAINTENANCE LETTER (OUTPATIENT)
Age: 35
End: 2025-04-26

## (undated) DEVICE — SU VICRYL 2-0 CT-2 CR 8X18" J726D

## (undated) DEVICE — NDL 22GA 1.5"

## (undated) DEVICE — PAD CHUX UNDERPAD 23X24" 7136

## (undated) DEVICE — DRSG TEGADERM 2 1/2X 2 3/4"

## (undated) DEVICE — SPONGE LAP 18X18" X8435

## (undated) DEVICE — SU MONOCRYL 3-0 PS-2 27" Y427H

## (undated) DEVICE — DRAIN JACKSON PRATT RESERVOIR 100ML SU130-1305

## (undated) DEVICE — STPL SKIN 35W 6.9MM  PXW35

## (undated) DEVICE — ESU ELEC BLADE NN-STCK PLUMEPEN PRO 360D 10FT PLPRO4020

## (undated) DEVICE — LINEN GOWN OVERSIZE 5408

## (undated) DEVICE — NDL COUNTER 20CT 31142493

## (undated) DEVICE — DRAIN JACKSON PRATT 15FR ROUND SIL LF JP-2229

## (undated) DEVICE — DRSG BIOPATCH GERMICIDAL SPLIT SPONGE 7MM LG

## (undated) DEVICE — SOL WATER IRRIG 1000ML BOTTLE 2F7114

## (undated) DEVICE — SYR EAR BULB 3OZ 0035830

## (undated) DEVICE — GLOVE BIOGEL PI MICRO INDICATOR UNDERGLOVE SZ 6.5 48965

## (undated) DEVICE — DRAPE IOBAN INCISE 23X17" 6650EZ

## (undated) DEVICE — LINEN TOWEL PACK X5 5464

## (undated) DEVICE — SLEEVE PROTECTIVE BREAST BIOPSY  GMSLV001-10

## (undated) DEVICE — GLOVE BIOGEL PI MICRO SZ 7.0 48570

## (undated) DEVICE — DRSG KERLIX 4 1/2"X4YDS ROLL 6715

## (undated) DEVICE — SYR 50ML LL W/O NDL 309653

## (undated) DEVICE — SOLUTION WOUND CLEANSING 3/4OZ 10% PVP EA-L3011FB-50

## (undated) DEVICE — PACK MAJOR SBA15MAFSI

## (undated) DEVICE — PEN MARKING SKIN

## (undated) DEVICE — DRSG ABDOMINAL 07 1/2X8" 7197D

## (undated) DEVICE — SU VICRYL 3-0 TIE 12X18" J904T

## (undated) DEVICE — GLOVE BIOGEL PI MICRO SZ 6.5 48565

## (undated) DEVICE — DRAPE BREAST/CHEST 29420

## (undated) DEVICE — SU MONOCRYL 4-0 P-3 18" UND Y494G

## (undated) DEVICE — SYR 10ML FINGER CONTROL W/O NDL 309695

## (undated) DEVICE — KIT SPY ELITE DISP KIT W/DRAPE SGL PACK LC3001

## (undated) DEVICE — GLOVE BIOGEL PI MICRO INDICATOR UNDERGLOVE SZ 7.5 48975

## (undated) DEVICE — TUBING INFUSION INFILTRATION LIPOSUCTION 156" 24-6008

## (undated) DEVICE — ESU ELEC BLADE 2.75" COATED/INSULATED E1455

## (undated) DEVICE — SU SILK 2-0 FSL 18" 677G

## (undated) DEVICE — SU PDS II 2-0 CT-1 27" Z339H

## (undated) DEVICE — PREP CHLORAPREP 26ML TINTED HI-LITE ORANGE 930815

## (undated) DEVICE — SUCTION CANISTER MEDIVAC LINER 3000ML W/LID 65651-530

## (undated) DEVICE — Device

## (undated) DEVICE — GLOVE BIOGEL PI MICRO INDICATOR UNDERGLOVE SZ 7.0 48970

## (undated) DEVICE — DRAPE MAYO STAND 23X54 8337

## (undated) DEVICE — GLOVE BIOGEL PI MICRO SZ 6.0 48560

## (undated) DEVICE — DRSG STERI STRIP 1/2X4" R1547

## (undated) DEVICE — SOL NACL 0.9% IRRIG 1000ML BOTTLE 2F7124

## (undated) DEVICE — NDL 19GA 1.5"

## (undated) RX ORDER — FENTANYL CITRATE 0.05 MG/ML
INJECTION, SOLUTION INTRAMUSCULAR; INTRAVENOUS
Status: DISPENSED
Start: 2023-01-09

## (undated) RX ORDER — PROPOFOL 10 MG/ML
INJECTION, EMULSION INTRAVENOUS
Status: DISPENSED
Start: 2023-01-09

## (undated) RX ORDER — FENTANYL CITRATE 50 UG/ML
INJECTION, SOLUTION INTRAMUSCULAR; INTRAVENOUS
Status: DISPENSED
Start: 2023-01-09

## (undated) RX ORDER — GABAPENTIN 600 MG/1
TABLET ORAL
Status: DISPENSED
Start: 2023-01-09

## (undated) RX ORDER — ONDANSETRON 2 MG/ML
INJECTION INTRAMUSCULAR; INTRAVENOUS
Status: DISPENSED
Start: 2023-01-09

## (undated) RX ORDER — HYDROMORPHONE HCL IN WATER/PF 6 MG/30 ML
PATIENT CONTROLLED ANALGESIA SYRINGE INTRAVENOUS
Status: DISPENSED
Start: 2023-01-09

## (undated) RX ORDER — DEXAMETHASONE SODIUM PHOSPHATE 4 MG/ML
INJECTION, SOLUTION INTRA-ARTICULAR; INTRALESIONAL; INTRAMUSCULAR; INTRAVENOUS; SOFT TISSUE
Status: DISPENSED
Start: 2023-01-09

## (undated) RX ORDER — WATER 10 ML/10ML
INJECTION INTRAMUSCULAR; INTRAVENOUS; SUBCUTANEOUS
Status: DISPENSED
Start: 2023-01-09

## (undated) RX ORDER — NEOSTIGMINE METHYLSULFATE 1 MG/ML
VIAL (ML) INJECTION
Status: DISPENSED
Start: 2023-01-09

## (undated) RX ORDER — ACETAMINOPHEN 500 MG
TABLET ORAL
Status: DISPENSED
Start: 2023-01-09

## (undated) RX ORDER — GLYCOPYRROLATE 0.2 MG/ML
INJECTION, SOLUTION INTRAMUSCULAR; INTRAVENOUS
Status: DISPENSED
Start: 2023-01-09